# Patient Record
Sex: FEMALE | Race: BLACK OR AFRICAN AMERICAN | NOT HISPANIC OR LATINO | Employment: OTHER | RURAL
[De-identification: names, ages, dates, MRNs, and addresses within clinical notes are randomized per-mention and may not be internally consistent; named-entity substitution may affect disease eponyms.]

---

## 2020-06-10 ENCOUNTER — HISTORICAL (OUTPATIENT)
Dept: ADMINISTRATIVE | Facility: HOSPITAL | Age: 65
End: 2020-06-10

## 2021-05-18 ENCOUNTER — OFFICE VISIT (OUTPATIENT)
Dept: FAMILY MEDICINE | Facility: CLINIC | Age: 66
End: 2021-05-18
Payer: COMMERCIAL

## 2021-05-18 VITALS
TEMPERATURE: 98 F | WEIGHT: 221.19 LBS | OXYGEN SATURATION: 98 % | DIASTOLIC BLOOD PRESSURE: 72 MMHG | HEIGHT: 70 IN | SYSTOLIC BLOOD PRESSURE: 150 MMHG | HEART RATE: 78 BPM | BODY MASS INDEX: 31.67 KG/M2

## 2021-05-18 DIAGNOSIS — Z79.4 TYPE 2 DIABETES MELLITUS WITHOUT COMPLICATION, WITH LONG-TERM CURRENT USE OF INSULIN: Primary | ICD-10-CM

## 2021-05-18 DIAGNOSIS — E11.9 TYPE 2 DIABETES MELLITUS WITHOUT COMPLICATION, WITH LONG-TERM CURRENT USE OF INSULIN: Primary | ICD-10-CM

## 2021-05-18 DIAGNOSIS — M19.90 ARTHRITIS: ICD-10-CM

## 2021-05-18 DIAGNOSIS — E03.9 HYPOTHYROIDISM, UNSPECIFIED TYPE: ICD-10-CM

## 2021-05-18 DIAGNOSIS — I10 ESSENTIAL HYPERTENSION: ICD-10-CM

## 2021-05-18 PROCEDURE — 99213 OFFICE O/P EST LOW 20 MIN: CPT | Mod: ,,, | Performed by: NURSE PRACTITIONER

## 2021-05-18 PROCEDURE — 99213 PR OFFICE/OUTPT VISIT, EST, LEVL III, 20-29 MIN: ICD-10-PCS | Mod: ,,, | Performed by: NURSE PRACTITIONER

## 2021-05-18 RX ORDER — HYDRALAZINE HYDROCHLORIDE 25 MG/1
25 TABLET, FILM COATED ORAL 3 TIMES DAILY
Qty: 90 TABLET | Refills: 1 | Status: SHIPPED | OUTPATIENT
Start: 2021-05-18 | End: 2021-08-11 | Stop reason: SDUPTHER

## 2021-05-18 RX ORDER — ESOMEPRAZOLE MAGNESIUM 40 MG/1
40 CAPSULE, DELAYED RELEASE ORAL DAILY
COMMUNITY
Start: 2021-04-16 | End: 2021-05-18 | Stop reason: SDUPTHER

## 2021-05-18 RX ORDER — LEVOTHYROXINE SODIUM 75 UG/1
75 TABLET ORAL DAILY
COMMUNITY
Start: 2021-04-14 | End: 2021-05-18 | Stop reason: SDUPTHER

## 2021-05-18 RX ORDER — GLIPIZIDE AND METFORMIN HCL 5; 500 MG/1; MG/1
2 TABLET, FILM COATED ORAL
Qty: 360 TABLET | Refills: 1 | Status: SHIPPED | OUTPATIENT
Start: 2021-05-18 | End: 2021-08-11 | Stop reason: SDUPTHER

## 2021-05-18 RX ORDER — VALSARTAN 160 MG/1
160 TABLET ORAL DAILY
Qty: 90 TABLET | Refills: 1 | Status: SHIPPED | OUTPATIENT
Start: 2021-05-18 | End: 2021-08-11 | Stop reason: SDUPTHER

## 2021-05-18 RX ORDER — ATORVASTATIN CALCIUM 40 MG/1
40 TABLET, FILM COATED ORAL DAILY
COMMUNITY
Start: 2021-04-14 | End: 2021-05-18 | Stop reason: SDUPTHER

## 2021-05-18 RX ORDER — CETIRIZINE HYDROCHLORIDE 10 MG/1
10 TABLET ORAL NIGHTLY
Qty: 90 TABLET | Refills: 1 | Status: SHIPPED | OUTPATIENT
Start: 2021-05-18 | End: 2021-08-11 | Stop reason: SDUPTHER

## 2021-05-18 RX ORDER — METOPROLOL SUCCINATE 25 MG/1
25 TABLET, EXTENDED RELEASE ORAL DAILY
COMMUNITY
Start: 2021-04-14 | End: 2021-05-18 | Stop reason: SDUPTHER

## 2021-05-18 RX ORDER — DAPAGLIFLOZIN 10 MG/1
10 TABLET, FILM COATED ORAL DAILY
Qty: 90 TABLET | Refills: 1 | Status: SHIPPED | OUTPATIENT
Start: 2021-05-18 | End: 2021-08-11 | Stop reason: SDUPTHER

## 2021-05-18 RX ORDER — METOPROLOL SUCCINATE 25 MG/1
25 TABLET, EXTENDED RELEASE ORAL DAILY
Qty: 90 TABLET | Refills: 1 | Status: SHIPPED | OUTPATIENT
Start: 2021-05-18 | End: 2021-08-11 | Stop reason: SDUPTHER

## 2021-05-18 RX ORDER — VALSARTAN 160 MG/1
160 TABLET ORAL DAILY
COMMUNITY
Start: 2021-04-14 | End: 2021-05-18 | Stop reason: SDUPTHER

## 2021-05-18 RX ORDER — ASPIRIN 81 MG/1
81 TABLET ORAL DAILY
COMMUNITY

## 2021-05-18 RX ORDER — CETIRIZINE HYDROCHLORIDE 10 MG/1
10 TABLET ORAL NIGHTLY
COMMUNITY
Start: 2021-04-14 | End: 2021-05-18 | Stop reason: SDUPTHER

## 2021-05-18 RX ORDER — GLIPIZIDE AND METFORMIN HCL 5; 500 MG/1; MG/1
TABLET, FILM COATED ORAL
COMMUNITY
Start: 2021-05-15 | End: 2021-05-18 | Stop reason: SDUPTHER

## 2021-05-18 RX ORDER — ATORVASTATIN CALCIUM 40 MG/1
40 TABLET, FILM COATED ORAL DAILY
Qty: 90 TABLET | Refills: 1 | Status: SHIPPED | OUTPATIENT
Start: 2021-05-18 | End: 2021-08-11 | Stop reason: SDUPTHER

## 2021-05-18 RX ORDER — LEVOTHYROXINE SODIUM 75 UG/1
75 TABLET ORAL DAILY
Qty: 30 TABLET | Refills: 1 | Status: SHIPPED | OUTPATIENT
Start: 2021-05-18 | End: 2021-08-11 | Stop reason: SDUPTHER

## 2021-05-18 RX ORDER — TRAMADOL HYDROCHLORIDE 50 MG/1
50 TABLET ORAL EVERY 6 HOURS
Qty: 30 TABLET | Refills: 0 | Status: SHIPPED | OUTPATIENT
Start: 2021-05-18 | End: 2021-05-26

## 2021-05-18 RX ORDER — PEN NEEDLE, DIABETIC 32GX 5/32"
NEEDLE, DISPOSABLE MISCELLANEOUS
COMMUNITY
Start: 2021-05-03 | End: 2021-06-27 | Stop reason: SDUPTHER

## 2021-05-18 RX ORDER — DAPAGLIFLOZIN 10 MG/1
10 TABLET, FILM COATED ORAL DAILY
COMMUNITY
Start: 2021-05-03 | End: 2021-05-18 | Stop reason: SDUPTHER

## 2021-05-18 RX ORDER — HYDRALAZINE HYDROCHLORIDE 25 MG/1
25 TABLET, FILM COATED ORAL 3 TIMES DAILY
COMMUNITY
Start: 2021-04-14 | End: 2021-05-18 | Stop reason: SDUPTHER

## 2021-05-18 RX ORDER — ESOMEPRAZOLE MAGNESIUM 40 MG/1
40 CAPSULE, DELAYED RELEASE ORAL DAILY
Qty: 90 CAPSULE | Refills: 1 | Status: SHIPPED | OUTPATIENT
Start: 2021-05-18 | End: 2021-08-11 | Stop reason: SDUPTHER

## 2021-05-18 RX ORDER — LATANOPROST 50 UG/ML
1 SOLUTION/ DROPS OPHTHALMIC NIGHTLY
COMMUNITY
Start: 2021-05-03

## 2021-06-07 ENCOUNTER — HOSPITAL ENCOUNTER (OUTPATIENT)
Dept: RADIOLOGY | Facility: HOSPITAL | Age: 66
Discharge: HOME | End: 2021-06-07
Attending: ORTHOPAEDIC SURGERY
Payer: COMMERCIAL

## 2021-06-07 DIAGNOSIS — M25.561 RIGHT KNEE PAIN: ICD-10-CM

## 2021-06-07 PROCEDURE — 73560 X-RAY EXAM OF KNEE 1 OR 2: CPT | Mod: TC,RT

## 2021-08-11 ENCOUNTER — OFFICE VISIT (OUTPATIENT)
Dept: FAMILY MEDICINE | Facility: CLINIC | Age: 66
End: 2021-08-11
Payer: COMMERCIAL

## 2021-08-11 VITALS
HEART RATE: 60 BPM | TEMPERATURE: 97 F | OXYGEN SATURATION: 99 % | DIASTOLIC BLOOD PRESSURE: 69 MMHG | SYSTOLIC BLOOD PRESSURE: 165 MMHG | BODY MASS INDEX: 31.9 KG/M2 | WEIGHT: 222.81 LBS | HEIGHT: 70 IN

## 2021-08-11 DIAGNOSIS — Z79.4 TYPE 2 DIABETES MELLITUS WITHOUT COMPLICATION, WITH LONG-TERM CURRENT USE OF INSULIN: Primary | ICD-10-CM

## 2021-08-11 DIAGNOSIS — F32.A DEPRESSION, UNSPECIFIED DEPRESSION TYPE: ICD-10-CM

## 2021-08-11 DIAGNOSIS — I10 ESSENTIAL HYPERTENSION: ICD-10-CM

## 2021-08-11 DIAGNOSIS — E11.9 TYPE 2 DIABETES MELLITUS WITHOUT COMPLICATION, WITH LONG-TERM CURRENT USE OF INSULIN: Primary | ICD-10-CM

## 2021-08-11 PROCEDURE — 83036 HEMOGLOBIN A1C: ICD-10-PCS | Mod: QW,,, | Performed by: CLINICAL MEDICAL LABORATORY

## 2021-08-11 PROCEDURE — 99213 PR OFFICE/OUTPT VISIT, EST, LEVL III, 20-29 MIN: ICD-10-PCS | Mod: ,,, | Performed by: NURSE PRACTITIONER

## 2021-08-11 PROCEDURE — 83036 HEMOGLOBIN GLYCOSYLATED A1C: CPT | Mod: QW,,, | Performed by: CLINICAL MEDICAL LABORATORY

## 2021-08-11 PROCEDURE — 99213 OFFICE O/P EST LOW 20 MIN: CPT | Mod: ,,, | Performed by: NURSE PRACTITIONER

## 2021-08-11 RX ORDER — ESOMEPRAZOLE MAGNESIUM 40 MG/1
40 CAPSULE, DELAYED RELEASE ORAL DAILY
Qty: 90 CAPSULE | Refills: 1 | Status: SHIPPED | OUTPATIENT
Start: 2021-08-11 | End: 2021-12-16 | Stop reason: SDUPTHER

## 2021-08-11 RX ORDER — GLIPIZIDE AND METFORMIN HCL 5; 500 MG/1; MG/1
2 TABLET, FILM COATED ORAL
Qty: 360 TABLET | Refills: 1 | Status: SHIPPED | OUTPATIENT
Start: 2021-08-11 | End: 2022-06-08 | Stop reason: SDUPTHER

## 2021-08-11 RX ORDER — ESCITALOPRAM OXALATE 20 MG/1
20 TABLET ORAL DAILY
COMMUNITY
End: 2021-08-11 | Stop reason: SDUPTHER

## 2021-08-11 RX ORDER — ATORVASTATIN CALCIUM 40 MG/1
40 TABLET, FILM COATED ORAL DAILY
Qty: 90 TABLET | Refills: 1 | Status: SHIPPED | OUTPATIENT
Start: 2021-08-11 | End: 2021-12-16 | Stop reason: SDUPTHER

## 2021-08-11 RX ORDER — DAPAGLIFLOZIN 10 MG/1
10 TABLET, FILM COATED ORAL DAILY
Qty: 90 TABLET | Refills: 1 | Status: SHIPPED | OUTPATIENT
Start: 2021-08-11 | End: 2021-12-16 | Stop reason: SDUPTHER

## 2021-08-11 RX ORDER — LEVOTHYROXINE SODIUM 75 UG/1
75 TABLET ORAL DAILY
Qty: 30 TABLET | Refills: 1 | Status: SHIPPED | OUTPATIENT
Start: 2021-08-11 | End: 2021-11-12

## 2021-08-11 RX ORDER — METOPROLOL SUCCINATE 25 MG/1
25 TABLET, EXTENDED RELEASE ORAL DAILY
Qty: 90 TABLET | Refills: 1 | Status: SHIPPED | OUTPATIENT
Start: 2021-08-11 | End: 2021-12-16 | Stop reason: SDUPTHER

## 2021-08-11 RX ORDER — CETIRIZINE HYDROCHLORIDE 10 MG/1
10 TABLET ORAL NIGHTLY
Qty: 90 TABLET | Refills: 1 | Status: SHIPPED | OUTPATIENT
Start: 2021-08-11 | End: 2021-12-16 | Stop reason: SDUPTHER

## 2021-08-11 RX ORDER — HYDRALAZINE HYDROCHLORIDE 25 MG/1
25 TABLET, FILM COATED ORAL 3 TIMES DAILY
Qty: 90 TABLET | Refills: 1 | Status: SHIPPED | OUTPATIENT
Start: 2021-08-11 | End: 2021-12-16 | Stop reason: SDUPTHER

## 2021-08-11 RX ORDER — VALSARTAN 160 MG/1
160 TABLET ORAL DAILY
Qty: 90 TABLET | Refills: 1 | Status: SHIPPED | OUTPATIENT
Start: 2021-08-11 | End: 2021-12-16 | Stop reason: SDUPTHER

## 2021-08-12 LAB
EST. AVERAGE GLUCOSE BLD GHB EST-MCNC: 174 MG/DL
HBA1C MFR BLD HPLC: 7.8 % (ref 4.5–6.6)

## 2021-08-23 RX ORDER — ESCITALOPRAM OXALATE 20 MG/1
20 TABLET ORAL DAILY
Qty: 30 TABLET | Refills: 3 | Status: SHIPPED | OUTPATIENT
Start: 2021-08-23 | End: 2021-12-16 | Stop reason: SDUPTHER

## 2021-12-16 ENCOUNTER — OFFICE VISIT (OUTPATIENT)
Dept: FAMILY MEDICINE | Facility: CLINIC | Age: 66
End: 2021-12-16
Payer: COMMERCIAL

## 2021-12-16 VITALS
DIASTOLIC BLOOD PRESSURE: 68 MMHG | TEMPERATURE: 97 F | WEIGHT: 223.81 LBS | BODY MASS INDEX: 32.04 KG/M2 | HEART RATE: 52 BPM | SYSTOLIC BLOOD PRESSURE: 157 MMHG | OXYGEN SATURATION: 100 % | HEIGHT: 70 IN

## 2021-12-16 DIAGNOSIS — E11.9 TYPE 2 DIABETES MELLITUS WITHOUT COMPLICATION, WITH LONG-TERM CURRENT USE OF INSULIN: Primary | ICD-10-CM

## 2021-12-16 DIAGNOSIS — Z79.4 TYPE 2 DIABETES MELLITUS WITHOUT COMPLICATION, WITH LONG-TERM CURRENT USE OF INSULIN: Primary | ICD-10-CM

## 2021-12-16 DIAGNOSIS — E03.9 HYPOTHYROIDISM, UNSPECIFIED TYPE: ICD-10-CM

## 2021-12-16 DIAGNOSIS — I10 ESSENTIAL HYPERTENSION: ICD-10-CM

## 2021-12-16 DIAGNOSIS — F32.A DEPRESSION, UNSPECIFIED DEPRESSION TYPE: ICD-10-CM

## 2021-12-16 LAB
ALBUMIN SERPL BCP-MCNC: 3.2 G/DL (ref 3.5–5)
ALBUMIN/GLOB SERPL: 0.7 {RATIO}
ALP SERPL-CCNC: 78 U/L (ref 50–130)
ALT SERPL W P-5'-P-CCNC: 11 U/L (ref 13–56)
ANION GAP SERPL CALCULATED.3IONS-SCNC: 5 MMOL/L (ref 7–16)
AST SERPL W P-5'-P-CCNC: 6 U/L (ref 15–37)
BASOPHILS # BLD AUTO: 0.05 K/UL (ref 0–0.2)
BASOPHILS NFR BLD AUTO: 0.6 % (ref 0–1)
BILIRUB SERPL-MCNC: 0.4 MG/DL (ref 0–1.2)
BILIRUB UR QL STRIP: NEGATIVE
BUN SERPL-MCNC: 11 MG/DL (ref 7–18)
BUN/CREAT SERPL: 14 (ref 6–20)
CALCIUM SERPL-MCNC: 8.8 MG/DL (ref 8.5–10.1)
CHLORIDE SERPL-SCNC: 108 MMOL/L (ref 98–107)
CLARITY UR: CLEAR
CO2 SERPL-SCNC: 30 MMOL/L (ref 21–32)
COLOR UR: YELLOW
CREAT SERPL-MCNC: 0.76 MG/DL (ref 0.55–1.02)
CREAT UR-MCNC: 59 MG/DL (ref 28–219)
DIFFERENTIAL METHOD BLD: ABNORMAL
EOSINOPHIL # BLD AUTO: 0.13 K/UL (ref 0–0.5)
EOSINOPHIL NFR BLD AUTO: 1.5 % (ref 1–4)
ERYTHROCYTE [DISTWIDTH] IN BLOOD BY AUTOMATED COUNT: 16.5 % (ref 11.5–14.5)
EST. AVERAGE GLUCOSE BLD GHB EST-MCNC: 164 MG/DL
GLOBULIN SER-MCNC: 4.5 G/DL (ref 2–4)
GLUCOSE SERPL-MCNC: 131 MG/DL (ref 74–106)
GLUCOSE UR STRIP-MCNC: >=1000 MG/DL
HBA1C MFR BLD HPLC: 7.5 % (ref 4.5–6.6)
HCT VFR BLD AUTO: 36.9 % (ref 38–47)
HGB BLD-MCNC: 11.2 G/DL (ref 12–16)
IMM GRANULOCYTES # BLD AUTO: 0.03 K/UL (ref 0–0.04)
IMM GRANULOCYTES NFR BLD: 0.4 % (ref 0–0.4)
KETONES UR STRIP-SCNC: NEGATIVE MG/DL
LEUKOCYTE ESTERASE UR QL STRIP: NEGATIVE
LYMPHOCYTES # BLD AUTO: 2.84 K/UL (ref 1–4.8)
LYMPHOCYTES NFR BLD AUTO: 33.3 % (ref 27–41)
MCH RBC QN AUTO: 25.3 PG (ref 27–31)
MCHC RBC AUTO-ENTMCNC: 30.4 G/DL (ref 32–36)
MCV RBC AUTO: 83.3 FL (ref 80–96)
MICROALBUMIN UR-MCNC: 6.9 MG/DL (ref 0–2.8)
MICROALBUMIN/CREAT RATIO PNL UR: 116.9 MG/G (ref 0–30)
MONOCYTES # BLD AUTO: 0.53 K/UL (ref 0–0.8)
MONOCYTES NFR BLD AUTO: 6.2 % (ref 2–6)
MPC BLD CALC-MCNC: 11.2 FL (ref 9.4–12.4)
NEUTROPHILS # BLD AUTO: 4.96 K/UL (ref 1.8–7.7)
NEUTROPHILS NFR BLD AUTO: 58 % (ref 53–65)
NITRITE UR QL STRIP: NEGATIVE
NRBC # BLD AUTO: 0 X10E3/UL
NRBC, AUTO (.00): 0 %
PH UR STRIP: 7 PH UNITS
PLATELET # BLD AUTO: 280 K/UL (ref 150–400)
POTASSIUM SERPL-SCNC: 4.4 MMOL/L (ref 3.5–5.1)
PROT SERPL-MCNC: 7.7 G/DL (ref 6.4–8.2)
PROT UR QL STRIP: NEGATIVE
RBC # BLD AUTO: 4.43 M/UL (ref 4.2–5.4)
RBC # UR STRIP: NEGATIVE /UL
SODIUM SERPL-SCNC: 139 MMOL/L (ref 136–145)
SP GR UR STRIP: 1.01
T4 SERPL-MCNC: 8.3 ΜG/DL (ref 4.8–13.9)
TSH SERPL DL<=0.005 MIU/L-ACNC: 2.15 UIU/ML (ref 0.36–3.74)
UROBILINOGEN UR STRIP-ACNC: 1 MG/DL
WBC # BLD AUTO: 8.54 K/UL (ref 4.5–11)

## 2021-12-16 PROCEDURE — 81003 URINALYSIS AUTO W/O SCOPE: CPT | Mod: QW,,, | Performed by: CLINICAL MEDICAL LABORATORY

## 2021-12-16 PROCEDURE — 3078F DIAST BP <80 MM HG: CPT | Mod: ,,, | Performed by: NURSE PRACTITIONER

## 2021-12-16 PROCEDURE — 3077F SYST BP >= 140 MM HG: CPT | Mod: ,,, | Performed by: NURSE PRACTITIONER

## 2021-12-16 PROCEDURE — 80053 COMPREHEN METABOLIC PANEL: CPT | Mod: ,,, | Performed by: CLINICAL MEDICAL LABORATORY

## 2021-12-16 PROCEDURE — 3008F PR BODY MASS INDEX (BMI) DOCUMENTED: ICD-10-PCS | Mod: ,,, | Performed by: NURSE PRACTITIONER

## 2021-12-16 PROCEDURE — 82043 MICROALBUMIN / CREATININE RATIO URINE: ICD-10-PCS | Mod: ,,, | Performed by: CLINICAL MEDICAL LABORATORY

## 2021-12-16 PROCEDURE — 3077F PR MOST RECENT SYSTOLIC BLOOD PRESSURE >= 140 MM HG: ICD-10-PCS | Mod: ,,, | Performed by: NURSE PRACTITIONER

## 2021-12-16 PROCEDURE — 84436 T4: ICD-10-PCS | Mod: ,,, | Performed by: CLINICAL MEDICAL LABORATORY

## 2021-12-16 PROCEDURE — 83036 HEMOGLOBIN A1C: ICD-10-PCS | Mod: ,,, | Performed by: CLINICAL MEDICAL LABORATORY

## 2021-12-16 PROCEDURE — 84443 ASSAY THYROID STIM HORMONE: CPT | Mod: ,,, | Performed by: CLINICAL MEDICAL LABORATORY

## 2021-12-16 PROCEDURE — 3066F NEPHROPATHY DOC TX: CPT | Mod: ,,, | Performed by: NURSE PRACTITIONER

## 2021-12-16 PROCEDURE — 3051F HG A1C>EQUAL 7.0%<8.0%: CPT | Mod: ,,, | Performed by: NURSE PRACTITIONER

## 2021-12-16 PROCEDURE — 99214 OFFICE O/P EST MOD 30 MIN: CPT | Mod: ,,, | Performed by: NURSE PRACTITIONER

## 2021-12-16 PROCEDURE — 85025 CBC WITH DIFFERENTIAL: ICD-10-PCS | Mod: ,,, | Performed by: CLINICAL MEDICAL LABORATORY

## 2021-12-16 PROCEDURE — 84443 TSH: ICD-10-PCS | Mod: ,,, | Performed by: CLINICAL MEDICAL LABORATORY

## 2021-12-16 PROCEDURE — 1159F PR MEDICATION LIST DOCUMENTED IN MEDICAL RECORD: ICD-10-PCS | Mod: ,,, | Performed by: NURSE PRACTITIONER

## 2021-12-16 PROCEDURE — 1160F RVW MEDS BY RX/DR IN RCRD: CPT | Mod: ,,, | Performed by: NURSE PRACTITIONER

## 2021-12-16 PROCEDURE — 82570 MICROALBUMIN / CREATININE RATIO URINE: ICD-10-PCS | Mod: ,,, | Performed by: CLINICAL MEDICAL LABORATORY

## 2021-12-16 PROCEDURE — 3051F PR MOST RECENT HEMOGLOBIN A1C LEVEL 7.0 - < 8.0%: ICD-10-PCS | Mod: ,,, | Performed by: NURSE PRACTITIONER

## 2021-12-16 PROCEDURE — 4010F PR ACE/ARB THEARPY RXD/TAKEN: ICD-10-PCS | Mod: ,,, | Performed by: NURSE PRACTITIONER

## 2021-12-16 PROCEDURE — 3060F POS MICROALBUMINURIA REV: CPT | Mod: ,,, | Performed by: NURSE PRACTITIONER

## 2021-12-16 PROCEDURE — 1160F PR REVIEW ALL MEDS BY PRESCRIBER/CLIN PHARMACIST DOCUMENTED: ICD-10-PCS | Mod: ,,, | Performed by: NURSE PRACTITIONER

## 2021-12-16 PROCEDURE — 1159F MED LIST DOCD IN RCRD: CPT | Mod: ,,, | Performed by: NURSE PRACTITIONER

## 2021-12-16 PROCEDURE — 81003 URINALYSIS: ICD-10-PCS | Mod: QW,,, | Performed by: CLINICAL MEDICAL LABORATORY

## 2021-12-16 PROCEDURE — 80053 COMPREHENSIVE METABOLIC PANEL: ICD-10-PCS | Mod: ,,, | Performed by: CLINICAL MEDICAL LABORATORY

## 2021-12-16 PROCEDURE — 3078F PR MOST RECENT DIASTOLIC BLOOD PRESSURE < 80 MM HG: ICD-10-PCS | Mod: ,,, | Performed by: NURSE PRACTITIONER

## 2021-12-16 PROCEDURE — 84436 ASSAY OF TOTAL THYROXINE: CPT | Mod: ,,, | Performed by: CLINICAL MEDICAL LABORATORY

## 2021-12-16 PROCEDURE — 99214 PR OFFICE/OUTPT VISIT, EST, LEVL IV, 30-39 MIN: ICD-10-PCS | Mod: ,,, | Performed by: NURSE PRACTITIONER

## 2021-12-16 PROCEDURE — 3008F BODY MASS INDEX DOCD: CPT | Mod: ,,, | Performed by: NURSE PRACTITIONER

## 2021-12-16 PROCEDURE — 85025 COMPLETE CBC W/AUTO DIFF WBC: CPT | Mod: ,,, | Performed by: CLINICAL MEDICAL LABORATORY

## 2021-12-16 PROCEDURE — 3066F PR DOCUMENTATION OF TREATMENT FOR NEPHROPATHY: ICD-10-PCS | Mod: ,,, | Performed by: NURSE PRACTITIONER

## 2021-12-16 PROCEDURE — 82043 UR ALBUMIN QUANTITATIVE: CPT | Mod: ,,, | Performed by: CLINICAL MEDICAL LABORATORY

## 2021-12-16 PROCEDURE — 4010F ACE/ARB THERAPY RXD/TAKEN: CPT | Mod: ,,, | Performed by: NURSE PRACTITIONER

## 2021-12-16 PROCEDURE — 3060F PR POS MICROALBUMINURIA RESULT DOCUMENTED/REVIEW: ICD-10-PCS | Mod: ,,, | Performed by: NURSE PRACTITIONER

## 2021-12-16 PROCEDURE — 83036 HEMOGLOBIN GLYCOSYLATED A1C: CPT | Mod: ,,, | Performed by: CLINICAL MEDICAL LABORATORY

## 2021-12-16 PROCEDURE — 82570 ASSAY OF URINE CREATININE: CPT | Mod: ,,, | Performed by: CLINICAL MEDICAL LABORATORY

## 2021-12-16 RX ORDER — METOPROLOL SUCCINATE 25 MG/1
25 TABLET, EXTENDED RELEASE ORAL DAILY
Qty: 90 TABLET | Refills: 1 | Status: SHIPPED | OUTPATIENT
Start: 2021-12-16 | End: 2022-06-08 | Stop reason: SDUPTHER

## 2021-12-16 RX ORDER — ATORVASTATIN CALCIUM 40 MG/1
40 TABLET, FILM COATED ORAL DAILY
Qty: 90 TABLET | Refills: 1 | Status: SHIPPED | OUTPATIENT
Start: 2021-12-16 | End: 2022-06-08 | Stop reason: SDUPTHER

## 2021-12-16 RX ORDER — VALSARTAN 160 MG/1
160 TABLET ORAL DAILY
Qty: 90 TABLET | Refills: 1 | Status: SHIPPED | OUTPATIENT
Start: 2021-12-16 | End: 2022-06-08 | Stop reason: SDUPTHER

## 2021-12-16 RX ORDER — HYDRALAZINE HYDROCHLORIDE 25 MG/1
25 TABLET, FILM COATED ORAL 3 TIMES DAILY
Qty: 90 TABLET | Refills: 1 | Status: SHIPPED | OUTPATIENT
Start: 2021-12-16 | End: 2022-06-08 | Stop reason: SDUPTHER

## 2021-12-16 RX ORDER — DAPAGLIFLOZIN 10 MG/1
10 TABLET, FILM COATED ORAL DAILY
Qty: 90 TABLET | Refills: 1 | Status: SHIPPED | OUTPATIENT
Start: 2021-12-16 | End: 2022-06-08 | Stop reason: SDUPTHER

## 2021-12-16 RX ORDER — LEVOTHYROXINE SODIUM 75 UG/1
75 TABLET ORAL EVERY MORNING
Qty: 30 TABLET | Refills: 5 | Status: SHIPPED | OUTPATIENT
Start: 2021-12-16 | End: 2022-06-08 | Stop reason: SDUPTHER

## 2021-12-16 RX ORDER — CETIRIZINE HYDROCHLORIDE 10 MG/1
10 TABLET ORAL NIGHTLY
Qty: 90 TABLET | Refills: 1 | Status: SHIPPED | OUTPATIENT
Start: 2021-12-16 | End: 2022-06-24 | Stop reason: SDUPTHER

## 2021-12-16 RX ORDER — ESCITALOPRAM OXALATE 20 MG/1
20 TABLET ORAL DAILY
Qty: 30 TABLET | Refills: 3 | Status: SHIPPED | OUTPATIENT
Start: 2021-12-16 | End: 2022-06-08 | Stop reason: SDUPTHER

## 2021-12-16 RX ORDER — ESOMEPRAZOLE MAGNESIUM 40 MG/1
40 CAPSULE, DELAYED RELEASE ORAL DAILY
Qty: 90 CAPSULE | Refills: 1 | Status: SHIPPED | OUTPATIENT
Start: 2021-12-16 | End: 2022-06-08 | Stop reason: SDUPTHER

## 2022-01-08 ENCOUNTER — HOSPITAL ENCOUNTER (EMERGENCY)
Facility: HOSPITAL | Age: 67
Discharge: HOME | End: 2022-01-08
Payer: COMMERCIAL

## 2022-01-08 VITALS
DIASTOLIC BLOOD PRESSURE: 74 MMHG | BODY MASS INDEX: 34.03 KG/M2 | SYSTOLIC BLOOD PRESSURE: 180 MMHG | HEART RATE: 66 BPM | TEMPERATURE: 99 F | RESPIRATION RATE: 18 BRPM | HEIGHT: 68 IN | OXYGEN SATURATION: 95 %

## 2022-01-08 DIAGNOSIS — R05.9 COUGH: ICD-10-CM

## 2022-01-08 DIAGNOSIS — U07.1 COVID-19: Primary | ICD-10-CM

## 2022-01-08 LAB
FLUAV AG UPPER RESP QL IA.RAPID: NEGATIVE
FLUBV AG UPPER RESP QL IA.RAPID: NEGATIVE
SARS-COV+SARS-COV-2 AG RESP QL IA.RAPID: POSITIVE

## 2022-01-08 PROCEDURE — 87804 INFLUENZA ASSAY W/OPTIC: CPT | Performed by: NURSE PRACTITIONER

## 2022-01-08 PROCEDURE — 87426 SARSCOV CORONAVIRUS AG IA: CPT | Performed by: NURSE PRACTITIONER

## 2022-01-08 PROCEDURE — 99283 EMERGENCY DEPT VISIT LOW MDM: CPT

## 2022-01-08 PROCEDURE — 99283 EMERGENCY DEPT VISIT LOW MDM: CPT | Mod: GF | Performed by: NURSE PRACTITIONER

## 2022-01-08 RX ORDER — ALBUTEROL SULFATE 90 UG/1
2 AEROSOL, METERED RESPIRATORY (INHALATION) EVERY 6 HOURS PRN
Qty: 18 G | Refills: 0 | Status: SHIPPED | OUTPATIENT
Start: 2022-01-08 | End: 2022-06-08

## 2022-01-08 RX ORDER — AZITHROMYCIN 250 MG/1
250 TABLET, FILM COATED ORAL DAILY
Qty: 6 TABLET | Refills: 0 | Status: SHIPPED | OUTPATIENT
Start: 2022-01-08 | End: 2022-05-21 | Stop reason: CLARIF

## 2022-01-08 RX ORDER — MONTELUKAST SODIUM 10 MG/1
10 TABLET ORAL NIGHTLY
Qty: 30 TABLET | Refills: 0 | Status: SHIPPED | OUTPATIENT
Start: 2022-01-08 | End: 2022-02-07

## 2022-01-08 RX ORDER — BENZONATATE 100 MG/1
100 CAPSULE ORAL 3 TIMES DAILY PRN
Qty: 30 CAPSULE | Refills: 0 | Status: SHIPPED | OUTPATIENT
Start: 2022-01-08 | End: 2022-01-18

## 2022-01-08 RX ORDER — AZITHROMYCIN 250 MG/1
250 TABLET, FILM COATED ORAL DAILY
Qty: 6 TABLET | Refills: 0 | Status: SHIPPED | OUTPATIENT
Start: 2022-01-08 | End: 2022-01-08 | Stop reason: SDUPTHER

## 2022-01-08 NOTE — ED PROVIDER NOTES
Encounter Date: 1/8/2022       History     Chief Complaint   Patient presents with    Cough     Estefania Urias is a 67 yo AAF who presents with productive cough with white sputum x 2 days. Denies fever, SOB, wheezing. No otalgia or sore throat.         Review of patient's allergies indicates:  No Known Allergies  Past Medical History:   Diagnosis Date    Diabetes mellitus, type 2     Hypertension      Past Surgical History:   Procedure Laterality Date    HYSTERECTOMY       History reviewed. No pertinent family history.  Social History     Tobacco Use    Smoking status: Never Smoker    Smokeless tobacco: Never Used   Substance Use Topics    Alcohol use: Never    Drug use: Never     Review of Systems   Constitutional: Positive for fatigue.   Respiratory: Positive for cough. Negative for chest tightness, shortness of breath and wheezing.    Cardiovascular: Negative for chest pain, palpitations and leg swelling.   Neurological: Negative for dizziness, light-headedness and headaches.   All other systems reviewed and are negative.      Physical Exam     Initial Vitals [01/08/22 1438]   BP Pulse Resp Temp SpO2   (!) 185/73 67 20 98.7 °F (37.1 °C) 96 %      MAP       --         Physical Exam    Nursing note and vitals reviewed.  Constitutional: She appears well-developed and well-nourished.   HENT:   Head: Normocephalic and atraumatic.   Right Ear: External ear normal.   Left Ear: External ear normal.   Nose: Nose normal.   Mouth/Throat: Oropharynx is clear and moist.   Eyes: EOM are normal. Pupils are equal, round, and reactive to light.   Neck: Neck supple.   Normal range of motion.  Cardiovascular: Normal rate and regular rhythm.   Pulmonary/Chest: Effort normal and breath sounds normal.   Musculoskeletal:         General: Normal range of motion.      Cervical back: Normal range of motion and neck supple.     Neurological: She is alert and oriented to person, place, and time. GCS score is 15. GCS eye subscore is  4. GCS verbal subscore is 5. GCS motor subscore is 6.   Skin: Skin is warm.   Psychiatric: She has a normal mood and affect. Her behavior is normal. Judgment and thought content normal.         Medical Screening Exam   See Full Note    ED Course   Procedures  Labs Reviewed   SARS ANTIGEN(MARIANGEL) - Abnormal; Notable for the following components:       Result Value    COVID-19 Ag Positive (*)     All other components within normal limits    Narrative:     Positive SARS-CoV antigen results indicate the presence of viral antigens; correlation with patient history and presence of clinical signs & symptoms consistent with COVID-19 are necessary to determine infection status.   RAPID INFLUENZA A/B - Normal          Imaging Results    None          Medications - No data to display  Medical Decision Making:   Initial Assessment:   Cough  Differential Diagnosis:   Bronchitis, PNA, common cold, COVID  Clinical Tests:   Lab Tests: Ordered and Reviewed  ED Management:  - 67 yo AAF with cough   - COVID positive  - Discussed isolation and vitamin regimen  - Return precautions given             ED Course as of 01/08/22 1511   Sat Jan 08, 2022   1508 COVID-19 Ag(!!): Positive [MJ]      ED Course User Index  [MJ] KENZIE Beltran          Clinical Impression:   Final diagnoses:  [U07.1] COVID-19 (Primary)  [R05.9] Cough          ED Disposition Condition    Discharge Stable        ED Prescriptions     Medication Sig Dispense Start Date End Date Auth. Provider    montelukast (SINGULAIR) 10 mg tablet Take 1 tablet (10 mg total) by mouth every evening. 30 tablet 1/8/2022 2/7/2022 KENZIE Beltran    albuterol (PROAIR HFA) 90 mcg/actuation inhaler Inhale 2 puffs into the lungs every 6 (six) hours as needed for Wheezing. Rescue 18 g 1/8/2022 1/8/2023 KENZIE Beltran    benzonatate (TESSALON) 100 MG capsule Take 1 capsule (100 mg total) by mouth 3 (three) times daily as needed for Cough. 30 capsule 1/8/2022 1/18/2022 Jennifer  KENZIE Patel        Follow-up Information     Follow up With Specialties Details Why Contact Info    [facility] In 3 days If symptoms worsen [address]  [facility]  [place] [address] [phone]             KENZIE Beltran  01/08/22 9645

## 2022-01-08 NOTE — DISCHARGE INSTRUCTIONS
You are Positive for COVID. You must quarantine for 5 days AND be fever free for 24 hours without the use of Tylenol or Ibuprofen.     The following has shown some improvement in COVID symptoms:     Zyrtec 10 mg 2 x a day  Mucinex 600 mg 2 x a day  Pepcid 20 mg 2 x a day  Vitamin C 500 mg 2 x a day  Vitamin D3 5000 IU daily  Zinc 50 mg daily  Singulair 10 mg at bedtime  Aspirin 81 mg daily  Flonase daily if needed for sinus symptoms.     All of this is available over the counter except for Singulair - prescription provided. Make sure you are drinking plenty of fluids and rest. Thank you for choosing [facility] for your health care needs.

## 2022-05-21 ENCOUNTER — HOSPITAL ENCOUNTER (EMERGENCY)
Facility: HOSPITAL | Age: 67
Discharge: HOME | End: 2022-05-21
Payer: COMMERCIAL

## 2022-05-21 VITALS
WEIGHT: 222 LBS | RESPIRATION RATE: 17 BRPM | DIASTOLIC BLOOD PRESSURE: 73 MMHG | HEIGHT: 70 IN | SYSTOLIC BLOOD PRESSURE: 192 MMHG | BODY MASS INDEX: 31.78 KG/M2 | TEMPERATURE: 98 F | HEART RATE: 55 BPM | OXYGEN SATURATION: 99 %
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
## 2022-05-21 DIAGNOSIS — M25.559 HIP PAIN: ICD-10-CM

## 2022-05-21 PROCEDURE — 63600175 PHARM REV CODE 636 W HCPCS

## 2022-05-21 PROCEDURE — 99284 EMERGENCY DEPT VISIT MOD MDM: CPT | Mod: 25

## 2022-05-21 PROCEDURE — 96372 THER/PROPH/DIAG INJ SC/IM: CPT

## 2022-05-21 PROCEDURE — 99283 EMERGENCY DEPT VISIT LOW MDM: CPT | Mod: GF,25

## 2022-05-21 RX ORDER — ORPHENADRINE CITRATE 30 MG/ML
60 INJECTION INTRAMUSCULAR; INTRAVENOUS
Status: COMPLETED | OUTPATIENT
Start: 2022-05-21 | End: 2022-05-21

## 2022-05-21 RX ORDER — IBUPROFEN 800 MG/1
800 TABLET ORAL EVERY 6 HOURS PRN
Qty: 12 TABLET | Refills: 0 | Status: SHIPPED | OUTPATIENT
Start: 2022-05-21 | End: 2022-05-24

## 2022-05-21 RX ORDER — TIZANIDINE 4 MG/1
4 TABLET ORAL EVERY 6 HOURS PRN
Qty: 12 TABLET | Refills: 0 | Status: SHIPPED | OUTPATIENT
Start: 2022-05-21 | End: 2022-05-24

## 2022-05-21 RX ORDER — KETOROLAC TROMETHAMINE 30 MG/ML
30 INJECTION, SOLUTION INTRAMUSCULAR; INTRAVENOUS
Status: COMPLETED | OUTPATIENT
Start: 2022-05-21 | End: 2022-05-21

## 2022-05-21 RX ADMIN — ORPHENADRINE CITRATE 60 MG: 30 INJECTION INTRAMUSCULAR; INTRAVENOUS at 04:05

## 2022-05-21 RX ADMIN — KETOROLAC TROMETHAMINE 30 MG: 30 INJECTION, SOLUTION INTRAMUSCULAR at 04:05

## 2022-05-21 NOTE — ED PROVIDER NOTES
Encounter Date: 5/21/2022       History     Chief Complaint   Patient presents with    Fall    Hip Pain     Mrs. Urias is a 67 y/o who presents to the Emergency Department with c/o left hip s/p fall. Patient stated that she tripped on the rug at the front entrance. Patient denies any chest pain, shortness of breath, nausea, vomiting, and LOC.       The history is provided by the patient.   Fall  The accident occurred just prior to arrival. The fall occurred while walking. She fell from a height of 3 to 5 ft. She landed on carpet. The point of impact was the left hip. The pain is present in the left hip. The pain is at a severity of 6/10. She was ambulatory at the scene. There was no entrapment after the fall. There was no drug use involved in the accident. There was no alcohol use involved in the accident. Pertinent negatives include no neck pain, no back pain, no paresthesias, no paralysis, no visual change, no fever, no numbness, no abdominal pain, no bowel incontinence, no nausea, no vomiting, no hematuria, no headaches, no hearing loss, no loss of consciousness and no tingling. The symptoms are aggravated by activity. She has tried nothing for the symptoms. The treatment provided no relief.   Hip Pain  Pertinent negatives include no chest pain, no abdominal pain, no headaches and no shortness of breath.     Review of patient's allergies indicates:  No Known Allergies  Past Medical History:   Diagnosis Date    Diabetes mellitus, type 2     Hypertension      Past Surgical History:   Procedure Laterality Date    HYSTERECTOMY       History reviewed. No pertinent family history.  Social History     Tobacco Use    Smoking status: Never Smoker    Smokeless tobacco: Never Used   Substance Use Topics    Alcohol use: Never    Drug use: Never     Review of Systems   Constitutional: Negative for fever.   HENT: Negative for sore throat.    Respiratory: Negative for apnea, cough, choking, chest tightness,  shortness of breath, wheezing and stridor.    Cardiovascular: Negative for chest pain, palpitations and leg swelling.   Gastrointestinal: Negative for abdominal pain, bowel incontinence, nausea and vomiting.   Genitourinary: Negative for dysuria and hematuria.   Musculoskeletal: Negative for arthralgias, back pain, gait problem, joint swelling, myalgias, neck pain and neck stiffness.   Skin: Negative for rash.   Neurological: Negative for tingling, loss of consciousness, weakness, numbness, headaches and paresthesias.   Hematological: Does not bruise/bleed easily.       Physical Exam     Initial Vitals [05/21/22 1550]   BP Pulse Resp Temp SpO2   (!) 192/73 (!) 55 17 98 °F (36.7 °C) 99 %      MAP       --         Physical Exam    Constitutional: She appears well-developed and well-nourished. She is not diaphoretic. She is cooperative.  Non-toxic appearance. She does not have a sickly appearance. She does not appear ill. No distress.   HENT:   Head: Normocephalic and atraumatic.   Eyes: Conjunctivae, EOM and lids are normal. Pupils are equal, round, and reactive to light.   Neck: Trachea normal. Neck supple.   Normal range of motion.   Full passive range of motion without pain.     Cardiovascular: Normal rate, regular rhythm, S1 normal, S2 normal, normal heart sounds and normal pulses. Exam reveals no friction rub.    No murmur heard.  Pulmonary/Chest: Effort normal and breath sounds normal. She has no decreased breath sounds. She has no wheezes. She has no rhonchi. She has no rales.   Abdominal: Abdomen is soft and flat. Bowel sounds are normal. There is no abdominal tenderness.   Musculoskeletal:      Cervical back: Full passive range of motion without pain, normal range of motion and neck supple.      Left hip: Tenderness present. No deformity, lacerations, bony tenderness or crepitus. Normal range of motion. Normal strength.     Lymphadenopathy:     She has no cervical adenopathy.   Neurological: She is alert and  oriented to person, place, and time. She has normal strength. No cranial nerve deficit or sensory deficit. GCS eye subscore is 4. GCS verbal subscore is 5. GCS motor subscore is 6.   Skin: Skin is warm, dry and intact. Capillary refill takes less than 2 seconds.   Psychiatric: She has a normal mood and affect. Her speech is normal and behavior is normal. Judgment and thought content normal. Cognition and memory are normal.         Medical Screening Exam   See Full Note    ED Course   Procedures  Labs Reviewed - No data to display       Imaging Results          X-Ray Hip 2 or 3 views Left (with Pelvis when performed) (Final result)  Result time 05/21/22 15:48:41    Final result by Susu Schwartz MD (05/21/22 15:48:41)                 Impression:      Osteoarthritis.  No evidence of acute fracture or dislocation.      Electronically signed by: Susu Schwartz  Date:    05/21/2022  Time:    15:48             Narrative:    EXAMINATION:  XR HIP WITH PELVIS WHEN PERFORMED, 2 OR 3 VIEWS LEFT    CLINICAL HISTORY:  Pain;    TECHNIQUE:  AP view of the pelvis and frog leg lateral view of the left hip were performed.    COMPARISON:  None    FINDINGS:  The pubic symphysis and SI joints are of normal width. Degenerative changes are noted at both SI joints at the lower lumbar spine. The hip joint spaces demonstrate narrowing inferiorly and medially. There is mild acetabular osteophyte formation. There is no evidence of acute fracture or dislocation.                                 Medications   ketorolac injection 30 mg (has no administration in time range)   orphenadrine injection 60 mg (has no administration in time range)                       Clinical Impression:   Final diagnoses:  [W19.XXXA] Fall, initial encounter (Primary)  [M25.559] Hip pain          ED Disposition Condition    Discharge Stable        ED Prescriptions     Medication Sig Dispense Start Date End Date Auth. Provider    tiZANidine (ZANAFLEX) 4 MG tablet Take 1  tablet (4 mg total) by mouth every 6 (six) hours as needed (muscle spasm). 12 tablet 5/21/2022 5/24/2022 KENZIE Mariano    ibuprofen (ADVIL,MOTRIN) 800 MG tablet Take 1 tablet (800 mg total) by mouth every 6 (six) hours as needed for Pain. 12 tablet 5/21/2022 5/24/2022 KENZIE Mariano        Follow-up Information     Follow up With Specialties Details Why Contact Info    [facility] Schedule an appointment as soon as possible for a visit  As needed, If symptoms worsen [address]  [facility]  [place] [address] [phone]             KENZIE Mariano  05/21/22 1600

## 2022-05-21 NOTE — ED TRIAGE NOTES
Presents to ER s/p Fall. Patient reports tripping on the entry rug at [facility] landing on Left Hip. Ambulating without difficulty. Ice pack given at triage.

## 2022-05-21 NOTE — DISCHARGE INSTRUCTIONS
Take medication as directed by the label on the bottle, drink plenty of water, see Cindy Angella in the clinic as needed, return to the ED as any acute chest pain, shortness of breath, nausea and/or vomiting.

## 2022-06-08 ENCOUNTER — OFFICE VISIT (OUTPATIENT)
Dept: FAMILY MEDICINE | Facility: CLINIC | Age: 67
End: 2022-06-08
Payer: COMMERCIAL

## 2022-06-08 VITALS
BODY MASS INDEX: 32.1 KG/M2 | WEIGHT: 224.19 LBS | HEIGHT: 70 IN | OXYGEN SATURATION: 97 % | TEMPERATURE: 98 F | HEART RATE: 62 BPM | DIASTOLIC BLOOD PRESSURE: 73 MMHG | SYSTOLIC BLOOD PRESSURE: 152 MMHG

## 2022-06-08 DIAGNOSIS — I10 ESSENTIAL HYPERTENSION: ICD-10-CM

## 2022-06-08 DIAGNOSIS — E78.5 DYSLIPIDEMIA: ICD-10-CM

## 2022-06-08 DIAGNOSIS — E11.9 TYPE 2 DIABETES MELLITUS WITHOUT COMPLICATION, WITH LONG-TERM CURRENT USE OF INSULIN: Primary | ICD-10-CM

## 2022-06-08 DIAGNOSIS — E03.9 HYPOTHYROIDISM, UNSPECIFIED TYPE: ICD-10-CM

## 2022-06-08 DIAGNOSIS — K21.9 GASTROESOPHAGEAL REFLUX DISEASE, UNSPECIFIED WHETHER ESOPHAGITIS PRESENT: ICD-10-CM

## 2022-06-08 DIAGNOSIS — Z11.59 NEED FOR HEPATITIS C SCREENING TEST: ICD-10-CM

## 2022-06-08 DIAGNOSIS — E55.9 VITAMIN D DEFICIENCY, UNSPECIFIED: ICD-10-CM

## 2022-06-08 DIAGNOSIS — F32.A DEPRESSION, UNSPECIFIED DEPRESSION TYPE: ICD-10-CM

## 2022-06-08 DIAGNOSIS — Z79.4 TYPE 2 DIABETES MELLITUS WITHOUT COMPLICATION, WITH LONG-TERM CURRENT USE OF INSULIN: Primary | ICD-10-CM

## 2022-06-08 PROBLEM — M17.9 OSTEOARTHRITIS OF KNEE: Status: ACTIVE | Noted: 2021-06-09

## 2022-06-08 PROBLEM — E05.00 GRAVES' DISEASE: Status: ACTIVE | Noted: 2022-06-08

## 2022-06-08 PROCEDURE — 80061 LIPID PANEL: CPT | Mod: ,,, | Performed by: CLINICAL MEDICAL LABORATORY

## 2022-06-08 PROCEDURE — 86803 HEPATITIS C AB TEST: CPT | Mod: ,,, | Performed by: CLINICAL MEDICAL LABORATORY

## 2022-06-08 PROCEDURE — 99214 OFFICE O/P EST MOD 30 MIN: CPT | Mod: ,,, | Performed by: REGISTERED NURSE

## 2022-06-08 PROCEDURE — 82306 VITAMIN D 25 HYDROXY: CPT | Mod: ,,, | Performed by: CLINICAL MEDICAL LABORATORY

## 2022-06-08 PROCEDURE — 1160F RVW MEDS BY RX/DR IN RCRD: CPT | Mod: ,,, | Performed by: REGISTERED NURSE

## 2022-06-08 PROCEDURE — 3077F PR MOST RECENT SYSTOLIC BLOOD PRESSURE >= 140 MM HG: ICD-10-PCS | Mod: ,,, | Performed by: REGISTERED NURSE

## 2022-06-08 PROCEDURE — 3077F SYST BP >= 140 MM HG: CPT | Mod: ,,, | Performed by: REGISTERED NURSE

## 2022-06-08 PROCEDURE — 83036 HEMOGLOBIN GLYCOSYLATED A1C: CPT | Mod: ,,, | Performed by: CLINICAL MEDICAL LABORATORY

## 2022-06-08 PROCEDURE — 82043 MICROALBUMIN / CREATININE RATIO URINE: ICD-10-PCS | Mod: ,,, | Performed by: CLINICAL MEDICAL LABORATORY

## 2022-06-08 PROCEDURE — 85025 CBC WITH DIFFERENTIAL: ICD-10-PCS | Mod: ,,, | Performed by: CLINICAL MEDICAL LABORATORY

## 2022-06-08 PROCEDURE — 4010F PR ACE/ARB THEARPY RXD/TAKEN: ICD-10-PCS | Mod: ,,, | Performed by: REGISTERED NURSE

## 2022-06-08 PROCEDURE — 80053 COMPREHENSIVE METABOLIC PANEL: ICD-10-PCS | Mod: ,,, | Performed by: CLINICAL MEDICAL LABORATORY

## 2022-06-08 PROCEDURE — 80053 COMPREHEN METABOLIC PANEL: CPT | Mod: ,,, | Performed by: CLINICAL MEDICAL LABORATORY

## 2022-06-08 PROCEDURE — 85025 COMPLETE CBC W/AUTO DIFF WBC: CPT | Mod: ,,, | Performed by: CLINICAL MEDICAL LABORATORY

## 2022-06-08 PROCEDURE — 84443 TSH: ICD-10-PCS | Mod: ,,, | Performed by: CLINICAL MEDICAL LABORATORY

## 2022-06-08 PROCEDURE — 86803 HEPATITIS C ANTIBODY: ICD-10-PCS | Mod: ,,, | Performed by: CLINICAL MEDICAL LABORATORY

## 2022-06-08 PROCEDURE — 82570 ASSAY OF URINE CREATININE: CPT | Mod: ,,, | Performed by: CLINICAL MEDICAL LABORATORY

## 2022-06-08 PROCEDURE — 82043 UR ALBUMIN QUANTITATIVE: CPT | Mod: ,,, | Performed by: CLINICAL MEDICAL LABORATORY

## 2022-06-08 PROCEDURE — 82306 VITAMIN D: ICD-10-PCS | Mod: ,,, | Performed by: CLINICAL MEDICAL LABORATORY

## 2022-06-08 PROCEDURE — 80061 LIPID PANEL: ICD-10-PCS | Mod: ,,, | Performed by: CLINICAL MEDICAL LABORATORY

## 2022-06-08 PROCEDURE — 83036 HEMOGLOBIN A1C: ICD-10-PCS | Mod: ,,, | Performed by: CLINICAL MEDICAL LABORATORY

## 2022-06-08 PROCEDURE — 1159F MED LIST DOCD IN RCRD: CPT | Mod: ,,, | Performed by: REGISTERED NURSE

## 2022-06-08 PROCEDURE — 3051F HG A1C>EQUAL 7.0%<8.0%: CPT | Mod: ,,, | Performed by: REGISTERED NURSE

## 2022-06-08 PROCEDURE — 3008F PR BODY MASS INDEX (BMI) DOCUMENTED: ICD-10-PCS | Mod: ,,, | Performed by: REGISTERED NURSE

## 2022-06-08 PROCEDURE — 3078F DIAST BP <80 MM HG: CPT | Mod: ,,, | Performed by: REGISTERED NURSE

## 2022-06-08 PROCEDURE — 82570 MICROALBUMIN / CREATININE RATIO URINE: ICD-10-PCS | Mod: ,,, | Performed by: CLINICAL MEDICAL LABORATORY

## 2022-06-08 PROCEDURE — 3051F PR MOST RECENT HEMOGLOBIN A1C LEVEL 7.0 - < 8.0%: ICD-10-PCS | Mod: ,,, | Performed by: REGISTERED NURSE

## 2022-06-08 PROCEDURE — 1159F PR MEDICATION LIST DOCUMENTED IN MEDICAL RECORD: ICD-10-PCS | Mod: ,,, | Performed by: REGISTERED NURSE

## 2022-06-08 PROCEDURE — 81003 URINALYSIS AUTO W/O SCOPE: CPT | Mod: QW,,, | Performed by: CLINICAL MEDICAL LABORATORY

## 2022-06-08 PROCEDURE — 84443 ASSAY THYROID STIM HORMONE: CPT | Mod: ,,, | Performed by: CLINICAL MEDICAL LABORATORY

## 2022-06-08 PROCEDURE — 3008F BODY MASS INDEX DOCD: CPT | Mod: ,,, | Performed by: REGISTERED NURSE

## 2022-06-08 PROCEDURE — 81003 URINALYSIS, REFLEX TO URINE CULTURE: ICD-10-PCS | Mod: QW,,, | Performed by: CLINICAL MEDICAL LABORATORY

## 2022-06-08 PROCEDURE — 1160F PR REVIEW ALL MEDS BY PRESCRIBER/CLIN PHARMACIST DOCUMENTED: ICD-10-PCS | Mod: ,,, | Performed by: REGISTERED NURSE

## 2022-06-08 PROCEDURE — 3078F PR MOST RECENT DIASTOLIC BLOOD PRESSURE < 80 MM HG: ICD-10-PCS | Mod: ,,, | Performed by: REGISTERED NURSE

## 2022-06-08 PROCEDURE — 4010F ACE/ARB THERAPY RXD/TAKEN: CPT | Mod: ,,, | Performed by: REGISTERED NURSE

## 2022-06-08 PROCEDURE — 99214 PR OFFICE/OUTPT VISIT, EST, LEVL IV, 30-39 MIN: ICD-10-PCS | Mod: ,,, | Performed by: REGISTERED NURSE

## 2022-06-08 RX ORDER — BRIMONIDINE TARTRATE, TIMOLOL MALEATE 2; 5 MG/ML; MG/ML
1 SOLUTION/ DROPS OPHTHALMIC 2 TIMES DAILY
COMMUNITY
Start: 2022-05-24

## 2022-06-08 RX ORDER — GLIPIZIDE AND METFORMIN HCL 5; 500 MG/1; MG/1
2 TABLET, FILM COATED ORAL
Qty: 360 TABLET | Refills: 1 | Status: SHIPPED | OUTPATIENT
Start: 2022-06-08 | End: 2022-11-09 | Stop reason: SDUPTHER

## 2022-06-08 RX ORDER — ESOMEPRAZOLE MAGNESIUM 40 MG/1
40 CAPSULE, DELAYED RELEASE ORAL DAILY
Qty: 90 CAPSULE | Refills: 1 | Status: SHIPPED | OUTPATIENT
Start: 2022-06-08 | End: 2022-11-09 | Stop reason: SDUPTHER

## 2022-06-08 RX ORDER — HYDRALAZINE HYDROCHLORIDE 25 MG/1
25 TABLET, FILM COATED ORAL 3 TIMES DAILY
Qty: 270 TABLET | Refills: 1 | Status: SHIPPED | OUTPATIENT
Start: 2022-06-08 | End: 2022-11-09 | Stop reason: SDUPTHER

## 2022-06-08 RX ORDER — ESCITALOPRAM OXALATE 20 MG/1
20 TABLET ORAL DAILY
Qty: 90 TABLET | Refills: 1 | Status: SHIPPED | OUTPATIENT
Start: 2022-06-08 | End: 2023-03-23 | Stop reason: SDUPTHER

## 2022-06-08 RX ORDER — GLIPIZIDE AND METFORMIN HCL 5; 500 MG/1; MG/1
TABLET, FILM COATED ORAL
Qty: 360 TABLET | Refills: 1 | OUTPATIENT
Start: 2022-06-08

## 2022-06-08 RX ORDER — ATORVASTATIN CALCIUM 40 MG/1
40 TABLET, FILM COATED ORAL DAILY
Qty: 90 TABLET | Refills: 1 | Status: SHIPPED | OUTPATIENT
Start: 2022-06-08 | End: 2023-04-04

## 2022-06-08 RX ORDER — VALSARTAN 160 MG/1
160 TABLET ORAL DAILY
Qty: 90 TABLET | Refills: 1 | Status: SHIPPED | OUTPATIENT
Start: 2022-06-08 | End: 2022-11-09 | Stop reason: SDUPTHER

## 2022-06-08 RX ORDER — METOPROLOL SUCCINATE 25 MG/1
25 TABLET, EXTENDED RELEASE ORAL DAILY
Qty: 90 TABLET | Refills: 1 | Status: SHIPPED | OUTPATIENT
Start: 2022-06-08 | End: 2023-08-01 | Stop reason: SDUPTHER

## 2022-06-08 RX ORDER — DAPAGLIFLOZIN 10 MG/1
10 TABLET, FILM COATED ORAL DAILY
Qty: 90 TABLET | Refills: 1 | Status: SHIPPED | OUTPATIENT
Start: 2022-06-08 | End: 2022-11-09 | Stop reason: SDUPTHER

## 2022-06-08 RX ORDER — LEVOTHYROXINE SODIUM 75 UG/1
75 TABLET ORAL EVERY MORNING
Qty: 90 TABLET | Refills: 1 | Status: SHIPPED | OUTPATIENT
Start: 2022-06-08 | End: 2022-11-09 | Stop reason: SDUPTHER

## 2022-06-08 NOTE — PROGRESS NOTES
KENZIE Mercado        PATIENT NAME: Estefania Urias  : 1955  DATE: 22  MRN: [id number]      Billing Provider: KENZIE Mercado  Level of Service:   Patient PCP Information     Provider PCP Type    KENZIE Mercado General          Reason for Visit / Chief Complaint: Medication Refill       Update PCP  Update Chief Complaint         History of Present Illness / Problem Focused Workflow     HPI Mrs. Urias is a 67 y/o AAF here for diabetes, hypertension, and hypothyroidism. She is due for lab work and needs medication refills. Mrs. Mac reports she has seen Cardiologist in the past but has she does not go anymore. She is vague about medical history, only answers when asked a question. No complaints or concerns voiced at this time. She has history of Graves disease and has seen Optometrist for eye symptoms.     Review of Systems     Review of Systems   Constitutional: Negative.    Eyes: Positive for redness and eye dryness.   Respiratory: Negative.    Cardiovascular: Negative.    Gastrointestinal: Negative.    Neurological: Negative.    Psychiatric/Behavioral: Negative.         Medical / Social / Family History     Past Medical History:   Diagnosis Date    Diabetes mellitus, type 2     Hypertension        Past Surgical History:   Procedure Laterality Date    HYSTERECTOMY         Social History  Ms. Estefania Urias  reports that she has never smoked. She has never used smokeless tobacco. She reports that she does not drink alcohol and does not use drugs.    Family History  Ms. Estefania Urias's family history is not on file.    Medications and Allergies     Medications  Outpatient Medications Marked as Taking for the 22 encounter (Office Visit) with KENZIE Mercado   Medication Sig Dispense Refill    aspirin (ECOTRIN) 81 MG EC tablet Take 81 mg by mouth once daily.      cetirizine (ZYRTEC) 10 MG tablet Take 1 tablet (10 mg total) by mouth nightly. 90 tablet 1     "COMBIGAN 0.2-0.5 % Drop Place 1 drop into both eyes 2 (two) times daily.      latanoprost 0.005 % ophthalmic solution Place 1 drop into both eyes nightly.      ULTICARE PEN NEEDLE 32 gauge x 5/32" Ndle INJECT LEVEMIR AS DIRECTED 100 each 3    [DISCONTINUED] atorvastatin (LIPITOR) 40 MG tablet Take 1 tablet (40 mg total) by mouth once daily. 90 tablet 1    [DISCONTINUED] EScitalopram oxalate (LEXAPRO) 20 MG tablet Take 1 tablet (20 mg total) by mouth once daily. 30 tablet 3    [DISCONTINUED] esomeprazole (NEXIUM) 40 MG capsule Take 1 capsule (40 mg total) by mouth once daily. 90 capsule 1    [DISCONTINUED] FARXIGA 10 mg tablet Take 1 tablet (10 mg total) by mouth once daily. 90 tablet 1    [DISCONTINUED] hydrALAZINE (APRESOLINE) 25 MG tablet Take 1 tablet (25 mg total) by mouth 3 (three) times daily. 90 tablet 1    [DISCONTINUED] insulin detemir U-100 (LEVEMIR) 100 unit/mL injection Inject 20 Units into the skin once daily. 15 mL 1    [DISCONTINUED] levothyroxine (SYNTHROID) 75 MCG tablet Take 1 tablet (75 mcg total) by mouth every morning. 30 tablet 5    [DISCONTINUED] metoprolol succinate (TOPROL-XL) 25 MG 24 hr tablet Take 1 tablet (25 mg total) by mouth once daily. 90 tablet 1    [DISCONTINUED] valsartan (DIOVAN) 160 MG tablet Take 1 tablet (160 mg total) by mouth once daily. 90 tablet 1       Allergies  Review of patient's allergies indicates:  No Known Allergies    Physical Examination     Vitals:    06/08/22 1413   BP: (!) 152/73   Pulse: 62   Temp: 97.7 °F (36.5 °C)     Physical Exam  Vitals and nursing note reviewed.   Constitutional:       Appearance: She is obese.   HENT:      Head: Normocephalic and atraumatic.   Eyes:      Comments: Bulging eyes, sensitivity to light.   Cardiovascular:      Rate and Rhythm: Normal rate and regular rhythm.      Heart sounds: Normal heart sounds.   Pulmonary:      Effort: Pulmonary effort is normal.      Breath sounds: Normal breath sounds.   Musculoskeletal:    "      General: Normal range of motion.      Cervical back: Normal range of motion.   Skin:     General: Skin is warm and dry.   Neurological:      Mental Status: She is alert and oriented to person, place, and time.   Psychiatric:         Behavior: Behavior normal.        Assessment and Plan (including Health Maintenance)      Problem List  Smart Sets  Document Outside HM   Plan:   Type 2 diabetes mellitus without complication, with long-term current use of insulin  -     Hemoglobin A1C; Future; Expected date: 06/08/2022  -     FARXIGA 10 mg tablet; Take 1 tablet (10 mg total) by mouth once daily.  Dispense: 90 tablet; Refill: 1  -     glipizide-metformin (METAGLIP) 5-500 mg per tablet; Take 2 tablets by mouth 2 (two) times daily before meals.  Dispense: 360 tablet; Refill: 1  -     insulin detemir U-100 (LEVEMIR) 100 unit/mL injection; Inject 20 Units into the skin once daily.  Dispense: 18 mL; Refill: 1    Essential hypertension  -     CBC Auto Differential; Future; Expected date: 06/08/2022  -     Comprehensive Metabolic Panel; Future; Expected date: 06/08/2022  -     Microalbumin/Creatinine Ratio, Urine; Future; Expected date: 06/08/2022  -     Lipid Panel; Future; Expected date: 06/08/2022  -     Urinalysis, Reflex to Urine Culture; Future; Expected date: 06/08/2022  -     metoprolol succinate (TOPROL-XL) 25 MG 24 hr tablet; Take 1 tablet (25 mg total) by mouth once daily.  Dispense: 90 tablet; Refill: 1  -     hydrALAZINE (APRESOLINE) 25 MG tablet; Take 1 tablet (25 mg total) by mouth 3 (three) times daily.  Dispense: 270 tablet; Refill: 1    Hypothyroidism, unspecified type  -     TSH; Future; Expected date: 06/08/2022  -     levothyroxine (SYNTHROID) 75 MCG tablet; Take 1 tablet (75 mcg total) by mouth every morning.  Dispense: 90 tablet; Refill: 1    Need for hepatitis C screening test  -     Hepatitis C Antibody; Future; Expected date: 06/08/2022    Depression, unspecified depression type  -      EScitalopram oxalate (LEXAPRO) 20 MG tablet; Take 1 tablet (20 mg total) by mouth once daily.  Dispense: 90 tablet; Refill: 1    Dyslipidemia  -     atorvastatin (LIPITOR) 40 MG tablet; Take 1 tablet (40 mg total) by mouth once daily.  Dispense: 90 tablet; Refill: 1    Gastroesophageal reflux disease, unspecified whether esophagitis present  -     esomeprazole (NEXIUM) 40 MG capsule; Take 1 capsule (40 mg total) by mouth once daily.  Dispense: 90 capsule; Refill: 1    Vitamin D deficiency, unspecified  -     Vitamin D; Future; Expected date: 06/08/2022    Other orders  -     valsartan (DIOVAN) 160 MG tablet; Take 1 tablet (160 mg total) by mouth once daily.  Dispense: 90 tablet; Refill: 1       Health Maintenance Due   Topic Date Due    Hepatitis C Screening  Never done    Foot Exam  Never done    Eye Exam  Never done    HIV Screening  Never done    TETANUS VACCINE  Never done    Mammogram  Never done    Colorectal Cancer Screening  Never done    Shingles Vaccine (1 of 2) Never done    COVID-19 Vaccine (4 - Booster for Pfizer series) 02/08/2022    Lipid Panel  05/18/2022       Problem List Items Addressed This Visit        Cardiac/Vascular    Essential hypertension    Relevant Medications    metoprolol succinate (TOPROL-XL) 25 MG 24 hr tablet    hydrALAZINE (APRESOLINE) 25 MG tablet    Other Relevant Orders    CBC Auto Differential    Comprehensive Metabolic Panel    Microalbumin/Creatinine Ratio, Urine    Lipid Panel    Urinalysis, Reflex to Urine Culture       Endocrine    Type 2 diabetes mellitus without complication, with long-term current use of insulin - Primary    Relevant Medications    FARXIGA 10 mg tablet    glipizide-metformin (METAGLIP) 5-500 mg per tablet    insulin detemir U-100 (LEVEMIR) 100 unit/mL injection    Other Relevant Orders    Hemoglobin A1C    Vitamin D deficiency, unspecified    Relevant Orders    Vitamin D      Other Visit Diagnoses     Hypothyroidism, unspecified type         Relevant Medications    levothyroxine (SYNTHROID) 75 MCG tablet    Other Relevant Orders    TSH    Need for hepatitis C screening test        Relevant Orders    Hepatitis C Antibody    Depression, unspecified depression type        Relevant Medications    EScitalopram oxalate (LEXAPRO) 20 MG tablet    Dyslipidemia        Relevant Medications    atorvastatin (LIPITOR) 40 MG tablet    Gastroesophageal reflux disease, unspecified whether esophagitis present        Relevant Medications    esomeprazole (NEXIUM) 40 MG capsule          Health Maintenance Topics with due status: Not Due       Topic Last Completion Date    Diabetes Urine Screening 12/16/2021    Hemoglobin A1c 12/16/2021    Low Dose Statin 06/08/2022    Influenza Vaccine Not Due       Future Appointments   Date Time Provider Department Center   6/24/2022  9:00 AM AWV NURSE, [facility] PRIMARY CARE [facility] JOSEMANUEL Isaac        Patient Instructions    We will call you with lab results when available.     Take all medications as prescribed. Do not stop or start any medication(s) without consulting with your primary care provider or specialist. Make sure you are drinking plenty of water unless on a fluid restriction.   Cut salt intake, try to restrict sodium to 2 grams or less. Avoid fried foods and eat carbohydrates such as bread, pasta, and potatoes in moderation.    Follow up in about 5 months (around 11/8/2022), or if symptoms worsen or fail to improve.     Signature:  KENZIE Mercado      Date of encounter: 6/8/22

## 2022-06-08 NOTE — PATIENT INSTRUCTIONS
We will call you with lab results when available.     Take all medications as prescribed. Do not stop or start any medication(s) without consulting with your primary care provider or specialist. Make sure you are drinking plenty of water unless on a fluid restriction.   Cut salt intake, try to restrict sodium to 2 grams or less. Avoid fried foods and eat carbohydrates such as bread, pasta, and potatoes in moderation.

## 2022-06-09 LAB
25(OH)D3 SERPL-MCNC: 25.1 NG/ML
ALBUMIN SERPL BCP-MCNC: 3.5 G/DL (ref 3.5–5)
ALBUMIN/GLOB SERPL: 0.8 {RATIO}
ALP SERPL-CCNC: 91 U/L (ref 50–130)
ALT SERPL W P-5'-P-CCNC: 11 U/L (ref 13–56)
ANION GAP SERPL CALCULATED.3IONS-SCNC: 13 MMOL/L (ref 7–16)
AST SERPL W P-5'-P-CCNC: 8 U/L (ref 15–37)
BASOPHILS # BLD AUTO: 0.06 K/UL (ref 0–0.2)
BASOPHILS NFR BLD AUTO: 0.6 % (ref 0–1)
BILIRUB SERPL-MCNC: 0.4 MG/DL (ref 0–1.2)
BILIRUB UR QL STRIP: NEGATIVE
BUN SERPL-MCNC: 13 MG/DL (ref 7–18)
BUN/CREAT SERPL: 14 (ref 6–20)
CALCIUM SERPL-MCNC: 8.9 MG/DL (ref 8.5–10.1)
CHLORIDE SERPL-SCNC: 105 MMOL/L (ref 98–107)
CHOLEST SERPL-MCNC: 116 MG/DL (ref 0–200)
CHOLEST/HDLC SERPL: 3.3 {RATIO}
CLARITY UR: CLEAR
CO2 SERPL-SCNC: 25 MMOL/L (ref 21–32)
COLOR UR: YELLOW
CREAT SERPL-MCNC: 0.92 MG/DL (ref 0.55–1.02)
DIFFERENTIAL METHOD BLD: ABNORMAL
EOSINOPHIL # BLD AUTO: 0.17 K/UL (ref 0–0.5)
EOSINOPHIL NFR BLD AUTO: 1.8 % (ref 1–4)
ERYTHROCYTE [DISTWIDTH] IN BLOOD BY AUTOMATED COUNT: 14.6 % (ref 11.5–14.5)
EST. AVERAGE GLUCOSE BLD GHB EST-MCNC: 167 MG/DL
GLOBULIN SER-MCNC: 4.2 G/DL (ref 2–4)
GLUCOSE SERPL-MCNC: 113 MG/DL (ref 74–106)
GLUCOSE UR STRIP-MCNC: >=1000 MG/DL
HBA1C MFR BLD HPLC: 7.6 % (ref 4.5–6.6)
HCT VFR BLD AUTO: 39.9 % (ref 38–47)
HCV AB SER QL: NORMAL
HDLC SERPL-MCNC: 35 MG/DL (ref 40–60)
HGB BLD-MCNC: 12.6 G/DL (ref 12–16)
IMM GRANULOCYTES # BLD AUTO: 0.04 K/UL (ref 0–0.04)
IMM GRANULOCYTES NFR BLD: 0.4 % (ref 0–0.4)
KETONES UR STRIP-SCNC: NEGATIVE MG/DL
LDLC SERPL CALC-MCNC: 58 MG/DL
LDLC/HDLC SERPL: 1.7 {RATIO}
LEUKOCYTE ESTERASE UR QL STRIP: NEGATIVE
LYMPHOCYTES # BLD AUTO: 3.44 K/UL (ref 1–4.8)
LYMPHOCYTES NFR BLD AUTO: 36.4 % (ref 27–41)
MCH RBC QN AUTO: 27.6 PG (ref 27–31)
MCHC RBC AUTO-ENTMCNC: 31.6 G/DL (ref 32–36)
MCV RBC AUTO: 87.5 FL (ref 80–96)
MONOCYTES # BLD AUTO: 0.52 K/UL (ref 0–0.8)
MONOCYTES NFR BLD AUTO: 5.5 % (ref 2–6)
MPC BLD CALC-MCNC: 11.5 FL (ref 9.4–12.4)
NEUTROPHILS # BLD AUTO: 5.21 K/UL (ref 1.8–7.7)
NEUTROPHILS NFR BLD AUTO: 55.3 % (ref 53–65)
NITRITE UR QL STRIP: NEGATIVE
NONHDLC SERPL-MCNC: 81 MG/DL
NRBC # BLD AUTO: 0 X10E3/UL
NRBC, AUTO (.00): 0 %
PH UR STRIP: 6 PH UNITS
PLATELET # BLD AUTO: 337 K/UL (ref 150–400)
POTASSIUM SERPL-SCNC: 4 MMOL/L (ref 3.5–5.1)
PROT SERPL-MCNC: 7.7 G/DL (ref 6.4–8.2)
PROT UR QL STRIP: NEGATIVE
RBC # BLD AUTO: 4.56 M/UL (ref 4.2–5.4)
RBC # UR STRIP: NEGATIVE /UL
SODIUM SERPL-SCNC: 139 MMOL/L (ref 136–145)
SP GR UR STRIP: <=1.005
TRIGL SERPL-MCNC: 114 MG/DL (ref 35–150)
TSH SERPL DL<=0.005 MIU/L-ACNC: 1.74 UIU/ML (ref 0.36–3.74)
UROBILINOGEN UR STRIP-ACNC: 0.2 MG/DL
VLDLC SERPL-MCNC: 23 MG/DL
WBC # BLD AUTO: 9.44 K/UL (ref 4.5–11)

## 2022-06-10 LAB
CREAT UR-MCNC: <13 MG/DL (ref 28–219)
MICROALBUMIN UR-MCNC: 0.5 MG/DL (ref 0–2.8)
MICROALBUMIN/CREAT RATIO PNL UR: ABNORMAL

## 2022-06-14 ENCOUNTER — TELEPHONE (OUTPATIENT)
Dept: FAMILY MEDICINE | Facility: CLINIC | Age: 67
End: 2022-06-14
Payer: COMMERCIAL

## 2022-06-14 NOTE — TELEPHONE ENCOUNTER
----- Message from Heidi Londono LPN sent at 6/14/2022  1:11 PM CDT -----    ----- Message -----  From: KENZIE Mercado  Sent: 6/13/2022   7:25 PM CDT  To: Heidi Londono LPN    Please let Mrs. Urias know her lab work looks okay. Her A1C is 7.6 which is about what it was last time. It does not need to be any higher than this. Her urine is clear and her cholesterol is okay for now. Her vitamin d level is 25, which is normal. No changes need to be made at this time. She needs to make sure she is taking her diabetic medications and monitoring her blood sugar. Encourage her to drink plenty of water.

## 2022-06-24 ENCOUNTER — OFFICE VISIT (OUTPATIENT)
Dept: FAMILY MEDICINE | Facility: CLINIC | Age: 67
End: 2022-06-24
Payer: COMMERCIAL

## 2022-06-24 VITALS
TEMPERATURE: 97 F | DIASTOLIC BLOOD PRESSURE: 71 MMHG | HEIGHT: 70 IN | BODY MASS INDEX: 31.79 KG/M2 | HEART RATE: 56 BPM | WEIGHT: 222.06 LBS | RESPIRATION RATE: 20 BRPM | OXYGEN SATURATION: 98 % | SYSTOLIC BLOOD PRESSURE: 140 MMHG

## 2022-06-24 DIAGNOSIS — Z79.4 TYPE 2 DIABETES MELLITUS WITHOUT COMPLICATION, WITH LONG-TERM CURRENT USE OF INSULIN: ICD-10-CM

## 2022-06-24 DIAGNOSIS — Z00.00 ENCOUNTER FOR SUBSEQUENT ANNUAL WELLNESS VISIT (AWV) IN MEDICARE PATIENT: Primary | ICD-10-CM

## 2022-06-24 DIAGNOSIS — E78.5 DYSLIPIDEMIA: ICD-10-CM

## 2022-06-24 DIAGNOSIS — E11.9 TYPE 2 DIABETES MELLITUS WITHOUT COMPLICATION, WITH LONG-TERM CURRENT USE OF INSULIN: ICD-10-CM

## 2022-06-24 DIAGNOSIS — Z12.12 ENCOUNTER FOR SCREENING FOR COLORECTAL MALIGNANT NEOPLASM: ICD-10-CM

## 2022-06-24 DIAGNOSIS — I10 ESSENTIAL HYPERTENSION: ICD-10-CM

## 2022-06-24 DIAGNOSIS — K21.9 GASTROESOPHAGEAL REFLUX DISEASE, UNSPECIFIED WHETHER ESOPHAGITIS PRESENT: ICD-10-CM

## 2022-06-24 DIAGNOSIS — E03.9 HYPOTHYROIDISM, UNSPECIFIED TYPE: ICD-10-CM

## 2022-06-24 DIAGNOSIS — Z12.11 ENCOUNTER FOR SCREENING FOR COLORECTAL MALIGNANT NEOPLASM: ICD-10-CM

## 2022-06-24 DIAGNOSIS — F32.A DEPRESSION, UNSPECIFIED DEPRESSION TYPE: ICD-10-CM

## 2022-06-24 DIAGNOSIS — Z12.31 ENCOUNTER FOR SCREENING MAMMOGRAM FOR MALIGNANT NEOPLASM OF BREAST: ICD-10-CM

## 2022-06-24 PROCEDURE — G0439 PPPS, SUBSEQ VISIT: HCPCS | Mod: ,,, | Performed by: REGISTERED NURSE

## 2022-06-24 PROCEDURE — 3077F PR MOST RECENT SYSTOLIC BLOOD PRESSURE >= 140 MM HG: ICD-10-PCS | Mod: ,,, | Performed by: REGISTERED NURSE

## 2022-06-24 PROCEDURE — 4010F ACE/ARB THERAPY RXD/TAKEN: CPT | Mod: ,,, | Performed by: REGISTERED NURSE

## 2022-06-24 PROCEDURE — 3077F SYST BP >= 140 MM HG: CPT | Mod: ,,, | Performed by: REGISTERED NURSE

## 2022-06-24 PROCEDURE — 1160F RVW MEDS BY RX/DR IN RCRD: CPT | Mod: ,,, | Performed by: REGISTERED NURSE

## 2022-06-24 PROCEDURE — 3078F DIAST BP <80 MM HG: CPT | Mod: ,,, | Performed by: REGISTERED NURSE

## 2022-06-24 PROCEDURE — 1159F MED LIST DOCD IN RCRD: CPT | Mod: ,,, | Performed by: REGISTERED NURSE

## 2022-06-24 PROCEDURE — 3008F PR BODY MASS INDEX (BMI) DOCUMENTED: ICD-10-PCS | Mod: ,,, | Performed by: REGISTERED NURSE

## 2022-06-24 PROCEDURE — 3051F PR MOST RECENT HEMOGLOBIN A1C LEVEL 7.0 - < 8.0%: ICD-10-PCS | Mod: ,,, | Performed by: REGISTERED NURSE

## 2022-06-24 PROCEDURE — 3051F HG A1C>EQUAL 7.0%<8.0%: CPT | Mod: ,,, | Performed by: REGISTERED NURSE

## 2022-06-24 PROCEDURE — 3078F PR MOST RECENT DIASTOLIC BLOOD PRESSURE < 80 MM HG: ICD-10-PCS | Mod: ,,, | Performed by: REGISTERED NURSE

## 2022-06-24 PROCEDURE — G0439 PR MEDICARE ANNUAL WELLNESS SUBSEQUENT VISIT: ICD-10-PCS | Mod: ,,, | Performed by: REGISTERED NURSE

## 2022-06-24 PROCEDURE — 3008F BODY MASS INDEX DOCD: CPT | Mod: ,,, | Performed by: REGISTERED NURSE

## 2022-06-24 PROCEDURE — 1159F PR MEDICATION LIST DOCUMENTED IN MEDICAL RECORD: ICD-10-PCS | Mod: ,,, | Performed by: REGISTERED NURSE

## 2022-06-24 PROCEDURE — 4010F PR ACE/ARB THEARPY RXD/TAKEN: ICD-10-PCS | Mod: ,,, | Performed by: REGISTERED NURSE

## 2022-06-24 PROCEDURE — 1160F PR REVIEW ALL MEDS BY PRESCRIBER/CLIN PHARMACIST DOCUMENTED: ICD-10-PCS | Mod: ,,, | Performed by: REGISTERED NURSE

## 2022-06-24 RX ORDER — CETIRIZINE HYDROCHLORIDE 10 MG/1
10 TABLET ORAL NIGHTLY
Qty: 90 TABLET | Refills: 1 | Status: SHIPPED | OUTPATIENT
Start: 2022-06-24 | End: 2023-05-01 | Stop reason: SDUPTHER

## 2022-06-24 RX ORDER — INSULIN DETEMIR 100 [IU]/ML
20 INJECTION, SOLUTION SUBCUTANEOUS NIGHTLY
Qty: 6 ML | Refills: 2 | Status: SHIPPED | OUTPATIENT
Start: 2022-06-24 | End: 2022-08-10 | Stop reason: SDUPTHER

## 2022-06-24 NOTE — PROGRESS NOTES
[facility]     PATIENT NAME: Estefania Urias   : 1955    AGE: 66 y.o. DATE: 2022   MRN: [id number]        Reason for Visit / Chief Complaint: Medicare AWV ([employer] SUBS AWV )        Estefania Urias presents for a Subsequent Medicare AWV today.     The following components were reviewed and updated:    Medical/Social/Family History:  Past Medical History:   Diagnosis Date    Diabetes mellitus, type 2     Hyperlipidemia     Hypertension         Family History   Problem Relation Age of Onset    Diabetes Mother     Heart disease Father         Social History     Tobacco Use   Smoking Status Never Smoker   Smokeless Tobacco Never Used      Social History     Substance and Sexual Activity   Alcohol Use Never       Family History   Problem Relation Age of Onset    Diabetes Mother     Heart disease Father        Past Surgical History:   Procedure Laterality Date    HYSTERECTOMY           · Allergies and Current Medications   Review of patient's allergies indicates:  No Known Allergies    Current Outpatient Medications:     aspirin (ECOTRIN) 81 MG EC tablet, Take 81 mg by mouth once daily., Disp: , Rfl:     atorvastatin (LIPITOR) 40 MG tablet, Take 1 tablet (40 mg total) by mouth once daily., Disp: 90 tablet, Rfl: 1    cetirizine (ZYRTEC) 10 MG tablet, Take 1 tablet (10 mg total) by mouth nightly., Disp: 90 tablet, Rfl: 1    COMBIGAN 0.2-0.5 % Drop, Place 1 drop into both eyes 2 (two) times daily., Disp: , Rfl:     EScitalopram oxalate (LEXAPRO) 20 MG tablet, Take 1 tablet (20 mg total) by mouth once daily., Disp: 90 tablet, Rfl: 1    esomeprazole (NEXIUM) 40 MG capsule, Take 1 capsule (40 mg total) by mouth once daily., Disp: 90 capsule, Rfl: 1    FARXIGA 10 mg tablet, Take 1 tablet (10 mg total) by mouth once daily., Disp: 90 tablet, Rfl: 1    glipizide-metformin (METAGLIP) 5-500 mg per tablet, Take 2 tablets by mouth 2 (two) times daily before meals., Disp:  "360 tablet, Rfl: 1    hydrALAZINE (APRESOLINE) 25 MG tablet, Take 1 tablet (25 mg total) by mouth 3 (three) times daily., Disp: 270 tablet, Rfl: 1    insulin detemir U-100 (LEVEMIR) 100 unit/mL injection, Inject 20 Units into the skin once daily., Disp: 18 mL, Rfl: 1    latanoprost 0.005 % ophthalmic solution, Place 1 drop into both eyes nightly., Disp: , Rfl:     levothyroxine (SYNTHROID) 75 MCG tablet, Take 1 tablet (75 mcg total) by mouth every morning., Disp: 90 tablet, Rfl: 1    metoprolol succinate (TOPROL-XL) 25 MG 24 hr tablet, Take 1 tablet (25 mg total) by mouth once daily., Disp: 90 tablet, Rfl: 1    valsartan (DIOVAN) 160 MG tablet, Take 1 tablet (160 mg total) by mouth once daily., Disp: 90 tablet, Rfl: 1    ULTICARE PEN NEEDLE 32 gauge x 5/32" Ndle, INJECT LEVEMIR AS DIRECTED, Disp: 100 each, Rfl: 3    · Health Risk Assessment   Fall Risk: No   Obesity: BMI 31.87      Advance Directive:  Does not have an advanced directive. Verbal education and written education included in today's AVS.   Depression: PHQ 9 score: 0    HTN:  yes, DASH diet, exercise, weight management, med compliance, home BP monitoring, and follow-up discussed.   T2DM: yes, diabetic diet, glucose monitoring, activity level, weight management, med compliance, and follow-up discussed.   Tobacco use: Denies  STI: NA    Alcohol misuse: Denies   Statin Use: Yes      · Health Risk Assessment  What is your age?:  (66)  Are you male or female?: Female  During the past four weeks, how much have you been bothered by emotional problems such as feeling anxious, depressed, irritable, sad, or downhearted and blue?: Not at all  During the past five weeks, has your physical and/or emotional health limited your social activities with family, friends, neighbors, or groups?: Not at all  During the past four weeks, how much bodily pain have you generally had?: Mild pain  During the past four weeks, was someone available to help if you needed and " wanted help?: Yes, as much as I wanted  During the past four weeks, what was the hardest physical activity you could do for at least two minutes?: Very light  Can you get to places out of walking distance without help?  (For example, can you travel alone on buses or taxis, or drive your own car?): Yes  Can you go shopping for groceries or clothes without someone's help?: No  Can you prepare your own meals?: Yes  Can you do your own housework without help?: Yes  Because of any health problems, do you need the help of another person with your personal care needs such as eating, bathing, dressing, or getting around the house?: No  Can you handle your own money without help?: No  During the past four weeks, how would you rate your health in general?: Good  How have things been going for you during the past four weeks?: Pretty well  Are you having difficulties driving your car?: Not applicable, I do not use a car  Do you always fasten your seat belt when you are in a car?: Yes, usually  How often in the past four weeks have you been bothered by falling or dizzy when standing up?: Sometimes  How often in the past four weeks have you been bothered by sexual problems?: Never  How often in the past four weeks have you been bothered by trouble eating well?: Never  How often in the past four weeks have you been bothered by teeth or denture problems?: Never  How often in the past four weeks have you been bothered with problems using the telephone?: Never  How often in the past four weeks have you been bothered by tiredness or fatigue?: Often  Have you fallen two or more times in the past year?: No  Are you afraid of falling?: Yes  Are you a smoker?: No  During the past four weeks, how many drinks of wine, beer, or other alcoholic beverages did you have?: No alcohol at all  Do you exercise for about 20 minutes three or more days a week?: Yes, some of the time  Have you been given any information to help you with hazards in your  house that might hurt you?: No  Have you been given any information to help you with keeping track of your medications?: No  How often do you have trouble taking medicines the way you've been told to take them?: I always take them as prescribed  How confident are you that you can control and manage most of your health problems?: Somewhat confident  What is your race? (Check all that apply.):     · Health Maintenance   Last eye exam: 05/2022 will request records   Last CV screen with lipids: 06/08/2022     Diabetes screening with fasting glucose or A1c: 06/08/2022   Colonoscopy: Due   Flu Vaccine: NA   Pneumonia vaccines: 03/26/2019-23   COVID vaccine: Pfizer 02/24/2021 03/23/2021 10/08/2021 06/10/2022   Hep B vaccine: NA   DEXA: NA   Last pap/pelvic: Pt had total hysterectomy   Last Mammogram: Due   Last PSA screen: NA   AAA screening: NA (once in lifetime for males 65-75 who have smoked > 100 cigarettes in lifetime)  HIV Screeing: Declined  Hepatitis C Screen: 06/08/2022  Low Dose CT Scan: NA    Health Maintenance Topics with due status: Not Due       Topic Last Completion Date    Low Dose Statin 06/08/2022    Diabetes Urine Screening 06/08/2022    Lipid Panel 06/08/2022    Hemoglobin A1c 06/08/2022    Influenza Vaccine Not Due     Health Maintenance Due   Topic Date Due    Foot Exam  Never done    Eye Exam  Never done    Mammogram  Never done    Colorectal Cancer Screening  Never done        Incontinence  Bowel: No  Bladder: No    Lab results available in Epic or see dates from [facility] above:   Lab Results   Component Value Date    CHOL 116 06/08/2022    CHOL 115 05/18/2021     Lab Results   Component Value Date    HDL 35 (L) 06/08/2022    HDL 37 (L) 05/18/2021     Lab Results   Component Value Date    LDLCALC 58 06/08/2022    LDLCALC 55 05/18/2021     Lab Results   Component Value Date    TRIG 114 06/08/2022    TRIG 117 05/18/2021     Lab Results   Component Value Date    CHOLHDL 3.3 06/08/2022     CHOLHDL 3.1 05/18/2021       Lab Results   Component Value Date    HGBA1C 7.6 (H) 06/08/2022       Sodium   Date Value Ref Range Status   06/08/2022 139 136 - 145 mmol/L Final     Potassium   Date Value Ref Range Status   06/08/2022 4.0 3.5 - 5.1 mmol/L Final     Chloride   Date Value Ref Range Status   06/08/2022 105 98 - 107 mmol/L Final     CO2   Date Value Ref Range Status   06/08/2022 25 21 - 32 mmol/L Final     Glucose   Date Value Ref Range Status   06/08/2022 113 (H) 74 - 106 mg/dL Final     BUN   Date Value Ref Range Status   06/08/2022 13 7 - 18 mg/dL Final     Creatinine   Date Value Ref Range Status   06/08/2022 0.92 0.55 - 1.02 mg/dL Final     Calcium   Date Value Ref Range Status   06/08/2022 8.9 8.5 - 10.1 mg/dL Final     Total Protein   Date Value Ref Range Status   06/08/2022 7.7 6.4 - 8.2 g/dL Final     Albumin   Date Value Ref Range Status   06/08/2022 3.5 3.5 - 5.0 g/dL Final     Bilirubin, Total   Date Value Ref Range Status   06/08/2022 0.4 0.0 - 1.2 mg/dL Final     Alk Phos   Date Value Ref Range Status   06/08/2022 91 50 - 130 U/L Final     AST   Date Value Ref Range Status   06/08/2022 8 (L) 15 - 37 U/L Final     ALT   Date Value Ref Range Status   06/08/2022 11 (L) 13 - 56 U/L Final     Anion Gap   Date Value Ref Range Status   06/08/2022 13 7 - 16 mmol/L Final     eGFR    Date Value Ref Range Status   12/16/2021 99 >=60 mL/min/1.73m² Final     eGFR   Date Value Ref Range Status   06/08/2022 65 >=60 mL/min/1.73m² Final          See Completed Assessments for Annual Wellness visit within the encounter summary    The following assessments were completed & reviewed:  · Depression Screening  · Cognitive function Screening  · Timed Get Up Test  · Whisper Test  · Vision Screen  · Health Risk Assessment  · Checklist of ADLs and IADLs      Objective  Vitals:    06/24/22 0930   BP: (!) 140/71   Pulse: (!) 56   Resp: 20   Temp: 97.4 °F (36.3 °C)   SpO2: 98%   Weight: 100.7 kg (222  "lb 1.3 oz)   Height: 5' 10" (1.778 m)   PainSc: 0-No pain      Body mass index is 31.87 kg/m².  Ideal body weight: 68.5 kg (151 lb 0.2 oz)     Physical Exam  Vitals and nursing note reviewed.   Constitutional:       Appearance: Normal appearance.   HENT:      Head: Normocephalic and atraumatic.   Cardiovascular:      Rate and Rhythm: Normal rate and regular rhythm.      Heart sounds: Normal heart sounds.   Musculoskeletal:         General: Normal range of motion.      Cervical back: Normal range of motion.   Skin:     General: Skin is warm and dry.   Neurological:      Mental Status: She is alert and oriented to person, place, and time.   Psychiatric:         Behavior: Behavior normal.       Assessment:     1. Encounter for subsequent annual wellness visit (AWV) in Medicare patient     Plan:    Referrals:   Mammogram and Colonoscopy    Advised to call office if does not hear from anyone with referral appt within 2-3 weeks to check on status of referral. Voiced understanding.        Discussed and provided with a screening schedule and personal prevention plan in accordance with USPSTF age appropriate recommendations and Medicare screening guidelines.   Education, counseling, and referrals were provided as needed.  After Visit Summary printed and given to patient which includes written education and a list of any referrals if indicated.     Education including advanced directives, diet, exercise, falls, and preventive health discussed with patient and patient verbalized understanding.      F/u plan for yearly AWV.    Signature:     "

## 2022-06-24 NOTE — PATIENT INSTRUCTIONS
Counseling and Referral of Other Preventative  (Italic type indicates deductible and co-insurance are waived)    Patient Name: Estefania Urias  Today's Date: 6/24/2022    Health Maintenance         Date Due Completion Date    Foot Exam Never done ---    Eye Exam Never done ---    Mammogram Never done ---    Colorectal Cancer Screening Never done ---    TETANUS VACCINE 06/24/2023 (Originally 7/15/2020) 7/15/2010    Shingles Vaccine (1 of 2) 06/24/2023 (Originally 12/31/2007) ---    HIV Screening 06/24/2028 (Originally 12/31/1972) ---    Influenza Vaccine (Season Ended) 09/01/2022 ---    Hemoglobin A1c 12/08/2022 6/8/2022    Low Dose Statin 06/08/2023 6/8/2022    Diabetes Urine Screening 06/08/2023 6/8/2022    Lipid Panel 06/08/2023 6/8/2022          No orders of the defined types were placed in this encounter.

## 2022-08-10 ENCOUNTER — OFFICE VISIT (OUTPATIENT)
Dept: FAMILY MEDICINE | Facility: CLINIC | Age: 67
End: 2022-08-10
Payer: COMMERCIAL

## 2022-08-10 VITALS
BODY MASS INDEX: 32.07 KG/M2 | WEIGHT: 224 LBS | HEART RATE: 57 BPM | TEMPERATURE: 97 F | OXYGEN SATURATION: 96 % | SYSTOLIC BLOOD PRESSURE: 153 MMHG | DIASTOLIC BLOOD PRESSURE: 72 MMHG | HEIGHT: 70 IN

## 2022-08-10 DIAGNOSIS — J06.9 UPPER RESPIRATORY TRACT INFECTION, UNSPECIFIED TYPE: Primary | ICD-10-CM

## 2022-08-10 DIAGNOSIS — Z79.4 TYPE 2 DIABETES MELLITUS WITHOUT COMPLICATION, WITH LONG-TERM CURRENT USE OF INSULIN: ICD-10-CM

## 2022-08-10 DIAGNOSIS — E11.9 TYPE 2 DIABETES MELLITUS WITHOUT COMPLICATION, WITH LONG-TERM CURRENT USE OF INSULIN: ICD-10-CM

## 2022-08-10 PROCEDURE — 99213 OFFICE O/P EST LOW 20 MIN: CPT | Mod: ,,, | Performed by: REGISTERED NURSE

## 2022-08-10 PROCEDURE — 3008F PR BODY MASS INDEX (BMI) DOCUMENTED: ICD-10-PCS | Mod: ,,, | Performed by: REGISTERED NURSE

## 2022-08-10 PROCEDURE — 3051F HG A1C>EQUAL 7.0%<8.0%: CPT | Mod: ,,, | Performed by: REGISTERED NURSE

## 2022-08-10 PROCEDURE — 3077F SYST BP >= 140 MM HG: CPT | Mod: ,,, | Performed by: REGISTERED NURSE

## 2022-08-10 PROCEDURE — 3078F DIAST BP <80 MM HG: CPT | Mod: ,,, | Performed by: REGISTERED NURSE

## 2022-08-10 PROCEDURE — 1160F RVW MEDS BY RX/DR IN RCRD: CPT | Mod: ,,, | Performed by: REGISTERED NURSE

## 2022-08-10 PROCEDURE — 4010F ACE/ARB THERAPY RXD/TAKEN: CPT | Mod: ,,, | Performed by: REGISTERED NURSE

## 2022-08-10 PROCEDURE — 3077F PR MOST RECENT SYSTOLIC BLOOD PRESSURE >= 140 MM HG: ICD-10-PCS | Mod: ,,, | Performed by: REGISTERED NURSE

## 2022-08-10 PROCEDURE — 3008F BODY MASS INDEX DOCD: CPT | Mod: ,,, | Performed by: REGISTERED NURSE

## 2022-08-10 PROCEDURE — 3078F PR MOST RECENT DIASTOLIC BLOOD PRESSURE < 80 MM HG: ICD-10-PCS | Mod: ,,, | Performed by: REGISTERED NURSE

## 2022-08-10 PROCEDURE — 1160F PR REVIEW ALL MEDS BY PRESCRIBER/CLIN PHARMACIST DOCUMENTED: ICD-10-PCS | Mod: ,,, | Performed by: REGISTERED NURSE

## 2022-08-10 PROCEDURE — 4010F PR ACE/ARB THEARPY RXD/TAKEN: ICD-10-PCS | Mod: ,,, | Performed by: REGISTERED NURSE

## 2022-08-10 PROCEDURE — 1159F PR MEDICATION LIST DOCUMENTED IN MEDICAL RECORD: ICD-10-PCS | Mod: ,,, | Performed by: REGISTERED NURSE

## 2022-08-10 PROCEDURE — 1159F MED LIST DOCD IN RCRD: CPT | Mod: ,,, | Performed by: REGISTERED NURSE

## 2022-08-10 PROCEDURE — 99213 PR OFFICE/OUTPT VISIT, EST, LEVL III, 20-29 MIN: ICD-10-PCS | Mod: ,,, | Performed by: REGISTERED NURSE

## 2022-08-10 PROCEDURE — 3051F PR MOST RECENT HEMOGLOBIN A1C LEVEL 7.0 - < 8.0%: ICD-10-PCS | Mod: ,,, | Performed by: REGISTERED NURSE

## 2022-08-10 RX ORDER — BENZONATATE 100 MG/1
100 CAPSULE ORAL 3 TIMES DAILY PRN
Qty: 30 CAPSULE | Refills: 0 | Status: SHIPPED | OUTPATIENT
Start: 2022-08-10 | End: 2022-08-20

## 2022-08-10 RX ORDER — INSULIN DETEMIR 100 [IU]/ML
20 INJECTION, SOLUTION SUBCUTANEOUS NIGHTLY
Qty: 6 ML | Refills: 2 | Status: SHIPPED | OUTPATIENT
Start: 2022-08-10 | End: 2023-01-03 | Stop reason: SDUPTHER

## 2022-08-10 RX ORDER — AZITHROMYCIN 250 MG/1
TABLET, FILM COATED ORAL
Qty: 6 TABLET | Refills: 0 | Status: SHIPPED | OUTPATIENT
Start: 2022-08-10 | End: 2022-08-15

## 2022-08-10 RX ORDER — MONTELUKAST SODIUM 10 MG/1
10 TABLET ORAL NIGHTLY
Qty: 30 TABLET | Refills: 0 | Status: SHIPPED | OUTPATIENT
Start: 2022-08-10 | End: 2022-09-09

## 2022-08-10 RX ORDER — ALBUTEROL SULFATE 90 UG/1
2 AEROSOL, METERED RESPIRATORY (INHALATION) EVERY 6 HOURS PRN
Qty: 18 G | Refills: 0 | Status: SHIPPED | OUTPATIENT
Start: 2022-08-10 | End: 2023-02-14 | Stop reason: SDUPTHER

## 2022-08-10 NOTE — PROGRESS NOTES
KENZIE Mercado        PATIENT NAME: Estefania Urias  : 1955  DATE: 8/10/22  MRN: [id number]      Billing Provider: KENZIE Mercado  Level of Service: DC OFFICE/OUTPT VISIT, EST, LEVL III, 20-29 MIN  Patient PCP Information       Provider PCP Type    KENZIE Mercado General            Reason for Visit / Chief Complaint: would like insulin to be changed to pen       Update PCP  Update Chief Complaint         History of Present Illness / Problem Focused Workflow     HPI Mrs. Urias is a 67 y/o AAF here with complaints of cough, nasal congestion, and sore throat. Symptoms started 5 days ago. No fever, no nausea, vomiting, or diarrhea. She has Zyrtec ordered HS, states she has not been taking routinely. When she picked up prescription for Levemir last month, patient states she was given insulin syringes and a multi-dose vial. Patient states she knows how to use multi-dose vial but states she has difficulty seeing the lines on the syringes and feels clumsy trying to prepare the insulin for injection. Prescription sent to pharmacy per ISAK was same as previous prescription for Levemir Pens. Contacted pharmacy who states multi-dose vial was filled in error from a prescription left from previous pcp.     Review of Systems     Review of Systems   Constitutional:  Positive for fatigue. Negative for activity change, appetite change, chills and fever.   HENT:  Positive for nasal congestion, ear pain, postnasal drip, sinus pressure/congestion, sneezing and sore throat.    Respiratory:  Positive for cough and shortness of breath. Negative for chest tightness and wheezing.    Gastrointestinal: Negative.    Genitourinary: Negative.    Musculoskeletal:  Positive for arthralgias and leg pain.   Neurological: Negative.    Psychiatric/Behavioral: Negative.        Medical / Social / Family History     Past Medical History:   Diagnosis Date    Diabetes mellitus, type 2     Hyperlipidemia     Hypertension   "    Past Surgical History:   Procedure Laterality Date    HYSTERECTOMY       Social History  Ms. Estefania Urias  reports that she has never smoked. She has never used smokeless tobacco. She reports that she does not drink alcohol and does not use drugs.    Family History  Ms. Estefania Urias's family history includes Diabetes in her mother; Heart disease in her father.    Medications and Allergies     Medications  Outpatient Medications Marked as Taking for the 8/10/22 encounter (Office Visit) with KENZIE Mercado   Medication Sig Dispense Refill    aspirin (ECOTRIN) 81 MG EC tablet Take 81 mg by mouth once daily.      atorvastatin (LIPITOR) 40 MG tablet Take 1 tablet (40 mg total) by mouth once daily. 90 tablet 1    cetirizine (ZYRTEC) 10 MG tablet Take 1 tablet (10 mg total) by mouth nightly. 90 tablet 1    COMBIGAN 0.2-0.5 % Drop Place 1 drop into both eyes 2 (two) times daily.      EScitalopram oxalate (LEXAPRO) 20 MG tablet Take 1 tablet (20 mg total) by mouth once daily. 90 tablet 1    esomeprazole (NEXIUM) 40 MG capsule Take 1 capsule (40 mg total) by mouth once daily. 90 capsule 1    FARXIGA 10 mg tablet Take 1 tablet (10 mg total) by mouth once daily. 90 tablet 1    glipizide-metformin (METAGLIP) 5-500 mg per tablet Take 2 tablets by mouth 2 (two) times daily before meals. 360 tablet 1    hydrALAZINE (APRESOLINE) 25 MG tablet Take 1 tablet (25 mg total) by mouth 3 (three) times daily. 270 tablet 1    latanoprost 0.005 % ophthalmic solution Place 1 drop into both eyes nightly.      levothyroxine (SYNTHROID) 75 MCG tablet Take 1 tablet (75 mcg total) by mouth every morning. 90 tablet 1    ULTICARE PEN NEEDLE 32 gauge x 5/32" Ndle INJECT LEVEMIR AS DIRECTED 100 each 3    valsartan (DIOVAN) 160 MG tablet Take 1 tablet (160 mg total) by mouth once daily. 90 tablet 1    [DISCONTINUED] insulin detemir U-100 (LEVEMIR FLEXTOUCH U-100 INSULN) 100 unit/mL (3 mL) InPn pen Inject 20 Units into the skin every " evening. 6 mL 2     Allergies  Review of patient's allergies indicates:  No Known Allergies    Physical Examination     Vitals:    08/10/22 1120   BP: (!) 153/72   Pulse:    Temp:      Physical Exam  Vitals and nursing note reviewed.   Constitutional:       Appearance: Normal appearance. She is obese.   HENT:      Head: Normocephalic and atraumatic.      Right Ear: Tympanic membrane is erythematous.      Nose: Congestion present.      Right Turbinates: Swollen.      Left Turbinates: Swollen.      Mouth/Throat:      Pharynx: Posterior oropharyngeal erythema present.   Cardiovascular:      Rate and Rhythm: Normal rate and regular rhythm.      Heart sounds: Normal heart sounds.   Pulmonary:      Effort: Pulmonary effort is normal.      Breath sounds: Normal breath sounds.   Abdominal:      General: Bowel sounds are normal.   Musculoskeletal:      Cervical back: Normal range of motion.      Comments: Osteoarthritis affecting back, shoulders, and bilateral lower extremities.   Skin:     General: Skin is warm and dry.   Neurological:      Mental Status: She is alert and oriented to person, place, and time.   Psychiatric:         Behavior: Behavior normal.      Assessment and Plan (including Health Maintenance)      Problem List  Smart Sets  Document Outside HM   Plan:   Upper respiratory tract infection, unspecified type  -     azithromycin (Z-AIDE) 250 MG tablet; Take 2 tablets by mouth on day 1; Take 1 tablet by mouth on days 2-5  Dispense: 6 tablet; Refill: 0  -     albuterol (VENTOLIN HFA) 90 mcg/actuation inhaler; Inhale 2 puffs into the lungs every 6 (six) hours as needed for Wheezing. Rescue  Dispense: 18 g; Refill: 0  -     benzonatate (TESSALON) 100 MG capsule; Take 1 capsule (100 mg total) by mouth 3 (three) times daily as needed for Cough.  Dispense: 30 capsule; Refill: 0  -     montelukast (SINGULAIR) 10 mg tablet; Take 1 tablet (10 mg total) by mouth every evening.  Dispense: 30 tablet; Refill: 0    Type 2  diabetes mellitus without complication, with long-term current use of insulin  -     insulin detemir U-100 (LEVEMIR FLEXTOUCH U-100 INSULN) 100 unit/mL (3 mL) InPn pen; Inject 20 Units into the skin every evening.  Dispense: 6 mL; Refill: 2         Health Maintenance Due   Topic Date Due    Foot Exam  Never done    Mammogram  Never done    Colorectal Cancer Screening  Never done     Problem List Items Addressed This Visit          Endocrine    Type 2 diabetes mellitus without complication, with long-term current use of insulin    Relevant Medications    insulin detemir U-100 (LEVEMIR FLEXTOUCH U-100 INSULN) 100 unit/mL (3 mL) InPn pen     Other Visit Diagnoses       Upper respiratory tract infection, unspecified type    -  Primary    Relevant Medications    albuterol (VENTOLIN HFA) 90 mcg/actuation inhaler    montelukast (SINGULAIR) 10 mg tablet          Health Maintenance Topics with due status: Not Due       Topic Last Completion Date    Diabetes Urine Screening 06/08/2022    Lipid Panel 06/08/2022    Hemoglobin A1c 06/08/2022    Eye Exam 06/29/2022    Low Dose Statin 08/10/2022    Influenza Vaccine Not Due     Future Appointments   Date Time Provider Department Center   11/8/2022  1:30 PM KENZIE Mercado [facility]SE[facility]EL Isaac   6/28/2023  1:00 PM AWV NURSE, [facility] JOSEMANUEL Isaac      Patient Instructions   Increase fluid intake to stay hydrated. Take all doses of antibiotic therapy as prescribed. Do not stop taking when symptoms resolve, complete dosing. May take Tylenol or ibuprofen for fever or pain. Stay active, move around at least every 2 hours when awake. Go to the ED for any worsened condition or difficulty breathing. Return to clinic if condition persists.      Insurance allowed pharmacy to fill prescription for Levemir pens, patient to  when she leaves clinic.   Follow up in about 3 months (around 11/10/2022), or if symptoms worsen or fail to improve.      Signature:  KENZIE Mercado      Date of encounter: 8/10/22

## 2022-08-10 NOTE — PATIENT INSTRUCTIONS
Increase fluid intake to stay hydrated. Take all doses of antibiotic therapy as prescribed. Do not stop taking when symptoms resolve, complete dosing. May take Tylenol or ibuprofen for fever or pain. Stay active, move around at least every 2 hours when awake. Go to the ED for any worsened condition or difficulty breathing. Return to clinic if condition persists.      Insurance allowed pharmacy to fill prescription for Levemir pens, patient to  when she leaves clinic.

## 2022-11-09 ENCOUNTER — OFFICE VISIT (OUTPATIENT)
Dept: FAMILY MEDICINE | Facility: CLINIC | Age: 67
End: 2022-11-09
Payer: COMMERCIAL

## 2022-11-09 VITALS
RESPIRATION RATE: 18 BRPM | BODY MASS INDEX: 33.21 KG/M2 | OXYGEN SATURATION: 97 % | SYSTOLIC BLOOD PRESSURE: 157 MMHG | HEIGHT: 70 IN | DIASTOLIC BLOOD PRESSURE: 77 MMHG | HEART RATE: 62 BPM | WEIGHT: 232 LBS

## 2022-11-09 DIAGNOSIS — J40 BRONCHITIS: Primary | ICD-10-CM

## 2022-11-09 DIAGNOSIS — Z71.89 COMPLEX CARE COORDINATION: ICD-10-CM

## 2022-11-09 DIAGNOSIS — Z12.31 ENCOUNTER FOR SCREENING MAMMOGRAM FOR MALIGNANT NEOPLASM OF BREAST: ICD-10-CM

## 2022-11-09 DIAGNOSIS — K21.9 GASTROESOPHAGEAL REFLUX DISEASE, UNSPECIFIED WHETHER ESOPHAGITIS PRESENT: ICD-10-CM

## 2022-11-09 DIAGNOSIS — R06.2 WHEEZING: ICD-10-CM

## 2022-11-09 DIAGNOSIS — E03.9 HYPOTHYROIDISM, UNSPECIFIED TYPE: ICD-10-CM

## 2022-11-09 DIAGNOSIS — I10 ESSENTIAL HYPERTENSION: ICD-10-CM

## 2022-11-09 DIAGNOSIS — E11.9 TYPE 2 DIABETES MELLITUS WITHOUT COMPLICATION, WITH LONG-TERM CURRENT USE OF INSULIN: ICD-10-CM

## 2022-11-09 DIAGNOSIS — Z79.4 TYPE 2 DIABETES MELLITUS WITHOUT COMPLICATION, WITH LONG-TERM CURRENT USE OF INSULIN: ICD-10-CM

## 2022-11-09 LAB
ANION GAP SERPL CALCULATED.3IONS-SCNC: 8 MMOL/L (ref 7–16)
BASOPHILS # BLD AUTO: 0.04 K/UL (ref 0–0.2)
BASOPHILS NFR BLD AUTO: 0.6 % (ref 0–1)
BUN SERPL-MCNC: 11 MG/DL (ref 7–18)
BUN/CREAT SERPL: 13 (ref 6–20)
CALCIUM SERPL-MCNC: 9.2 MG/DL (ref 8.5–10.1)
CHLORIDE SERPL-SCNC: 108 MMOL/L (ref 98–107)
CO2 SERPL-SCNC: 29 MMOL/L (ref 21–32)
CREAT SERPL-MCNC: 0.88 MG/DL (ref 0.55–1.02)
DIFFERENTIAL METHOD BLD: ABNORMAL
EGFR (NO RACE VARIABLE) (RUSH/TITUS): 73 ML/MIN/1.73M²
EOSINOPHIL # BLD AUTO: 0.18 K/UL (ref 0–0.5)
EOSINOPHIL NFR BLD AUTO: 2.9 % (ref 1–4)
ERYTHROCYTE [DISTWIDTH] IN BLOOD BY AUTOMATED COUNT: 14.8 % (ref 11.5–14.5)
GLUCOSE SERPL-MCNC: 74 MG/DL (ref 74–106)
HCT VFR BLD AUTO: 42.2 % (ref 38–47)
HGB BLD-MCNC: 13.3 G/DL (ref 12–16)
IMM GRANULOCYTES # BLD AUTO: 0.01 K/UL (ref 0–0.04)
IMM GRANULOCYTES NFR BLD: 0.2 % (ref 0–0.4)
LYMPHOCYTES # BLD AUTO: 3.74 K/UL (ref 1–4.8)
LYMPHOCYTES NFR BLD AUTO: 59.9 % (ref 27–41)
MCH RBC QN AUTO: 27.7 PG (ref 27–31)
MCHC RBC AUTO-ENTMCNC: 31.5 G/DL (ref 32–36)
MCV RBC AUTO: 87.7 FL (ref 80–96)
MONOCYTES # BLD AUTO: 0.46 K/UL (ref 0–0.8)
MONOCYTES NFR BLD AUTO: 7.4 % (ref 2–6)
MPC BLD CALC-MCNC: 11.8 FL (ref 9.4–12.4)
NEUTROPHILS # BLD AUTO: 1.81 K/UL (ref 1.8–7.7)
NEUTROPHILS NFR BLD AUTO: 29 % (ref 53–65)
NRBC # BLD AUTO: 0 X10E3/UL
NRBC, AUTO (.00): 0 %
NT-PROBNP SERPL-MCNC: 126 PG/ML (ref 1–125)
PLATELET # BLD AUTO: 239 K/UL (ref 150–400)
POTASSIUM SERPL-SCNC: 4.1 MMOL/L (ref 3.5–5.1)
RBC # BLD AUTO: 4.81 M/UL (ref 4.2–5.4)
SODIUM SERPL-SCNC: 141 MMOL/L (ref 136–145)
WBC # BLD AUTO: 6.24 K/UL (ref 4.5–11)

## 2022-11-09 PROCEDURE — 3051F PR MOST RECENT HEMOGLOBIN A1C LEVEL 7.0 - < 8.0%: ICD-10-PCS | Mod: ,,, | Performed by: REGISTERED NURSE

## 2022-11-09 PROCEDURE — 94640 AIRWAY INHALATION TREATMENT: CPT | Mod: ,,, | Performed by: REGISTERED NURSE

## 2022-11-09 PROCEDURE — 1160F RVW MEDS BY RX/DR IN RCRD: CPT | Mod: ,,, | Performed by: REGISTERED NURSE

## 2022-11-09 PROCEDURE — 80048 BASIC METABOLIC PNL TOTAL CA: CPT | Mod: ,,, | Performed by: CLINICAL MEDICAL LABORATORY

## 2022-11-09 PROCEDURE — 1160F PR REVIEW ALL MEDS BY PRESCRIBER/CLIN PHARMACIST DOCUMENTED: ICD-10-PCS | Mod: ,,, | Performed by: REGISTERED NURSE

## 2022-11-09 PROCEDURE — 83880 ASSAY OF NATRIURETIC PEPTIDE: CPT | Mod: ,,, | Performed by: CLINICAL MEDICAL LABORATORY

## 2022-11-09 PROCEDURE — 83880 NT-PRO NATRIURETIC PEPTIDE: ICD-10-PCS | Mod: ,,, | Performed by: CLINICAL MEDICAL LABORATORY

## 2022-11-09 PROCEDURE — 94640 PR INHAL RX, AIRWAY OBST/DX SPUTUM INDUCT: ICD-10-PCS | Mod: ,,, | Performed by: REGISTERED NURSE

## 2022-11-09 PROCEDURE — 4010F ACE/ARB THERAPY RXD/TAKEN: CPT | Mod: ,,, | Performed by: REGISTERED NURSE

## 2022-11-09 PROCEDURE — 99213 PR OFFICE/OUTPT VISIT, EST, LEVL III, 20-29 MIN: ICD-10-PCS | Mod: 25,,, | Performed by: REGISTERED NURSE

## 2022-11-09 PROCEDURE — 3008F BODY MASS INDEX DOCD: CPT | Mod: ,,, | Performed by: REGISTERED NURSE

## 2022-11-09 PROCEDURE — 3078F PR MOST RECENT DIASTOLIC BLOOD PRESSURE < 80 MM HG: ICD-10-PCS | Mod: ,,, | Performed by: REGISTERED NURSE

## 2022-11-09 PROCEDURE — 3077F SYST BP >= 140 MM HG: CPT | Mod: ,,, | Performed by: REGISTERED NURSE

## 2022-11-09 PROCEDURE — 85025 COMPLETE CBC W/AUTO DIFF WBC: CPT | Mod: ,,, | Performed by: CLINICAL MEDICAL LABORATORY

## 2022-11-09 PROCEDURE — 99213 OFFICE O/P EST LOW 20 MIN: CPT | Mod: 25,,, | Performed by: REGISTERED NURSE

## 2022-11-09 PROCEDURE — 1159F MED LIST DOCD IN RCRD: CPT | Mod: ,,, | Performed by: REGISTERED NURSE

## 2022-11-09 PROCEDURE — 96372 PR INJECTION,THERAP/PROPH/DIAG2ST, IM OR SUBCUT: ICD-10-PCS | Mod: 59,,, | Performed by: REGISTERED NURSE

## 2022-11-09 PROCEDURE — 3077F PR MOST RECENT SYSTOLIC BLOOD PRESSURE >= 140 MM HG: ICD-10-PCS | Mod: ,,, | Performed by: REGISTERED NURSE

## 2022-11-09 PROCEDURE — 3008F PR BODY MASS INDEX (BMI) DOCUMENTED: ICD-10-PCS | Mod: ,,, | Performed by: REGISTERED NURSE

## 2022-11-09 PROCEDURE — 4010F PR ACE/ARB THEARPY RXD/TAKEN: ICD-10-PCS | Mod: ,,, | Performed by: REGISTERED NURSE

## 2022-11-09 PROCEDURE — 1159F PR MEDICATION LIST DOCUMENTED IN MEDICAL RECORD: ICD-10-PCS | Mod: ,,, | Performed by: REGISTERED NURSE

## 2022-11-09 PROCEDURE — 96372 THER/PROPH/DIAG INJ SC/IM: CPT | Mod: 59,,, | Performed by: REGISTERED NURSE

## 2022-11-09 PROCEDURE — 3051F HG A1C>EQUAL 7.0%<8.0%: CPT | Mod: ,,, | Performed by: REGISTERED NURSE

## 2022-11-09 PROCEDURE — 80048 BASIC METABOLIC PANEL: ICD-10-PCS | Mod: ,,, | Performed by: CLINICAL MEDICAL LABORATORY

## 2022-11-09 PROCEDURE — 3078F DIAST BP <80 MM HG: CPT | Mod: ,,, | Performed by: REGISTERED NURSE

## 2022-11-09 PROCEDURE — 85025 CBC WITH DIFFERENTIAL: ICD-10-PCS | Mod: ,,, | Performed by: CLINICAL MEDICAL LABORATORY

## 2022-11-09 RX ORDER — PROMETHAZINE HYDROCHLORIDE AND DEXTROMETHORPHAN HYDROBROMIDE 6.25; 15 MG/5ML; MG/5ML
5 SYRUP ORAL EVERY 8 HOURS PRN
Qty: 118 ML | Refills: 0 | Status: SHIPPED | OUTPATIENT
Start: 2022-11-09 | End: 2022-11-19

## 2022-11-09 RX ORDER — HYDRALAZINE HYDROCHLORIDE 25 MG/1
25 TABLET, FILM COATED ORAL 3 TIMES DAILY
Qty: 270 TABLET | Refills: 1 | Status: SHIPPED | OUTPATIENT
Start: 2022-11-09 | End: 2023-05-01 | Stop reason: SDUPTHER

## 2022-11-09 RX ORDER — DAPAGLIFLOZIN 10 MG/1
10 TABLET, FILM COATED ORAL DAILY
Qty: 90 TABLET | Refills: 1 | Status: SHIPPED | OUTPATIENT
Start: 2022-11-09 | End: 2023-05-01 | Stop reason: SDUPTHER

## 2022-11-09 RX ORDER — LEVOTHYROXINE SODIUM 75 UG/1
75 TABLET ORAL EVERY MORNING
Qty: 90 TABLET | Refills: 1 | Status: SHIPPED | OUTPATIENT
Start: 2022-11-09 | End: 2023-05-01 | Stop reason: SDUPTHER

## 2022-11-09 RX ORDER — AZITHROMYCIN 250 MG/1
TABLET, FILM COATED ORAL
Qty: 6 TABLET | Refills: 0 | Status: SHIPPED | OUTPATIENT
Start: 2022-11-09 | End: 2022-11-14

## 2022-11-09 RX ORDER — CEFTRIAXONE 1 G/1
1 INJECTION, POWDER, FOR SOLUTION INTRAMUSCULAR; INTRAVENOUS
Status: COMPLETED | OUTPATIENT
Start: 2022-11-09 | End: 2022-11-09

## 2022-11-09 RX ORDER — GLIPIZIDE AND METFORMIN HCL 5; 500 MG/1; MG/1
2 TABLET, FILM COATED ORAL
Qty: 360 TABLET | Refills: 1 | Status: SHIPPED | OUTPATIENT
Start: 2022-11-09 | End: 2023-02-14 | Stop reason: SDUPTHER

## 2022-11-09 RX ORDER — ALBUTEROL SULFATE 0.83 MG/ML
2.5 SOLUTION RESPIRATORY (INHALATION) EVERY 6 HOURS PRN
Qty: 75 ML | Refills: 0 | Status: SHIPPED | OUTPATIENT
Start: 2022-11-09 | End: 2023-02-07

## 2022-11-09 RX ORDER — ESOMEPRAZOLE MAGNESIUM 40 MG/1
40 CAPSULE, DELAYED RELEASE ORAL DAILY
Qty: 90 CAPSULE | Refills: 1 | Status: SHIPPED | OUTPATIENT
Start: 2022-11-09 | End: 2023-05-01 | Stop reason: SDUPTHER

## 2022-11-09 RX ORDER — ALBUTEROL SULFATE 0.83 MG/ML
2.5 SOLUTION RESPIRATORY (INHALATION)
Status: COMPLETED | OUTPATIENT
Start: 2022-11-09 | End: 2022-11-09

## 2022-11-09 RX ORDER — VALSARTAN 160 MG/1
160 TABLET ORAL DAILY
Qty: 90 TABLET | Refills: 1 | Status: SHIPPED | OUTPATIENT
Start: 2022-11-09 | End: 2023-02-14 | Stop reason: SDUPTHER

## 2022-11-09 RX ADMIN — CEFTRIAXONE 1 G: 1 INJECTION, POWDER, FOR SOLUTION INTRAMUSCULAR; INTRAVENOUS at 04:11

## 2022-11-09 RX ADMIN — ALBUTEROL SULFATE 2.5 MG: 0.83 SOLUTION RESPIRATORY (INHALATION) at 03:11

## 2022-11-09 NOTE — PROGRESS NOTES
"   KENZIE Mercado        PATIENT NAME: Estefania Urias  : 1955  DATE: 22  MRN: [id number]      Billing Provider: KENZIE Mercado  Level of Service: [place] OFFICE/OUTPT VISIT, EST, LEVL III, 20-29 MIN  Patient PCP Information       Provider PCP Type    KENZIE Mercado General            Reason for Visit / Chief Complaint: Cough (C/o of a cough for about at week)       Update PCP  Update Chief Complaint         History of Present Illness / Problem Focused Workflow     HPI Ms. Urias is a 67 y/o AAF here with complaints of cough, chest congestion, and fatigue. Symptoms started 3-4 days ago with upper respiratory congestion and runny nose. She denies known exposure to infectious illness. No fever, no nausea, vomiting, or diarrhea. She has history of allergic rhinitis and recurrent upper respiratory infections. She is short of breath with exertion, states he chest feels tight and she cannot "get a good deep breath."     Review of Systems     Review of Systems   Constitutional:  Positive for activity change, appetite change, chills and fatigue.   HENT:  Positive for nasal congestion, ear pain, postnasal drip, sinus pressure/congestion, sneezing, sore throat and voice change.    Respiratory:  Positive for cough, chest tightness, shortness of breath and wheezing.    Cardiovascular:  Negative for chest pain and leg swelling.   Gastrointestinal:  Positive for reflux.   Musculoskeletal:  Positive for arthralgias, gait problem and myalgias.   Neurological:  Positive for weakness.      Medical / Social / Family History     Past Medical History:   Diagnosis Date    Diabetes mellitus, type 2     Hyperlipidemia     Hypertension        Past Surgical History:   Procedure Laterality Date    HYSTERECTOMY       Social History  Ms. Estefania Urias  reports that she has never smoked. She has never used smokeless tobacco. She reports that she does not drink alcohol and does not use drugs.    Family " "History  Ms. Estefania Urias's family history includes Diabetes in her mother; Heart disease in her father.    Medications and Allergies     Medications  Outpatient Medications Marked as Taking for the 11/9/22 encounter (Office Visit) with KENZIE Mercado   Medication Sig Dispense Refill    albuterol (VENTOLIN HFA) 90 mcg/actuation inhaler Inhale 2 puffs into the lungs every 6 (six) hours as needed for Wheezing. Rescue 18 g 0    aspirin (ECOTRIN) 81 MG EC tablet Take 81 mg by mouth once daily.      atorvastatin (LIPITOR) 40 MG tablet Take 1 tablet (40 mg total) by mouth once daily. 90 tablet 1    cetirizine (ZYRTEC) 10 MG tablet Take 1 tablet (10 mg total) by mouth nightly. 90 tablet 1    COMBIGAN 0.2-0.5 % Drop Place 1 drop into both eyes 2 (two) times daily.      EScitalopram oxalate (LEXAPRO) 20 MG tablet Take 1 tablet (20 mg total) by mouth once daily. 90 tablet 1    latanoprost 0.005 % ophthalmic solution Place 1 drop into both eyes nightly.      ULTICARE PEN NEEDLE 32 gauge x 5/32" Ndle INJECT LEVEMIR AS DIRECTED 100 each 3    [DISCONTINUED] esomeprazole (NEXIUM) 40 MG capsule Take 1 capsule (40 mg total) by mouth once daily. 90 capsule 1    [DISCONTINUED] FARXIGA 10 mg tablet Take 1 tablet (10 mg total) by mouth once daily. 90 tablet 1    [DISCONTINUED] glipizide-metformin (METAGLIP) 5-500 mg per tablet Take 2 tablets by mouth 2 (two) times daily before meals. 360 tablet 1    [DISCONTINUED] hydrALAZINE (APRESOLINE) 25 MG tablet Take 1 tablet (25 mg total) by mouth 3 (three) times daily. 270 tablet 1    [DISCONTINUED] levothyroxine (SYNTHROID) 75 MCG tablet Take 1 tablet (75 mcg total) by mouth every morning. 90 tablet 1    [DISCONTINUED] valsartan (DIOVAN) 160 MG tablet Take 1 tablet (160 mg total) by mouth once daily. 90 tablet 1     Allergies  Review of patient's allergies indicates:  No Known Allergies    Physical Examination     Vitals:    11/09/22 1437   BP: (!) 157/77   Pulse: 62   Resp: 18 "     Physical Exam  Vitals and nursing note reviewed.   Constitutional:       Appearance: Normal appearance. She is obese. She is ill-appearing.   HENT:      Head: Normocephalic and atraumatic.   Cardiovascular:      Rate and Rhythm: Normal rate and regular rhythm.      Heart sounds: Normal heart sounds.   Pulmonary:      Effort: Tachypnea present.      Breath sounds: Decreased air movement present. Examination of the right-upper field reveals wheezing. Examination of the left-upper field reveals wheezing. Examination of the left-lower field reveals decreased breath sounds. Decreased breath sounds and wheezing present.   Musculoskeletal:      Cervical back: Normal range of motion.      Lumbar back: Decreased range of motion.   Skin:     General: Skin is warm and dry.   Neurological:      Mental Status: She is alert and oriented to person, place, and time.      Assessment and Plan (including Health Maintenance)      Problem List  Smart Sets  Document Outside HM   Plan:   Bronchitis  -     albuterol (PROVENTIL) 2.5 mg /3 mL (0.083 %) nebulizer solution; Take 3 mLs (2.5 mg total) by nebulization every 6 (six) hours as needed for Wheezing or Shortness of Breath. Rescue  Dispense: 75 mL; Refill: 0  -     cefTRIAXone injection 1 g  -     promethazine-dextromethorphan (PROMETHAZINE-DM) 6.25-15 mg/5 mL Syrp; Take 5 mLs by mouth every 8 (eight) hours as needed (cough or congestion).  Dispense: 118 mL; Refill: 0  -     azithromycin (Z-AIDE) 250 MG tablet; Take 2 tablets by mouth on day 1; Take 1 tablet by mouth on days 2-5  Dispense: 6 tablet; Refill: 0    Hypothyroidism, unspecified type  -     levothyroxine (SYNTHROID) 75 MCG tablet; Take 1 tablet (75 mcg total) by mouth every morning.  Dispense: 90 tablet; Refill: 1    Essential hypertension  -     valsartan (DIOVAN) 160 MG tablet; Take 1 tablet (160 mg total) by mouth once daily.  Dispense: 90 tablet; Refill: 1  -     hydrALAZINE (APRESOLINE) 25 MG tablet; Take 1 tablet (25  mg total) by mouth 3 (three) times daily.  Dispense: 270 tablet; Refill: 1    Type 2 diabetes mellitus without complication, with long-term current use of insulin  -     glipizide-metformin (METAGLIP) 5-500 mg per tablet; Take 2 tablets by mouth 2 (two) times daily before meals.  Dispense: 360 tablet; Refill: 1  -     FARXIGA 10 mg tablet; Take 1 tablet (10 mg total) by mouth once daily.  Dispense: 90 tablet; Refill: 1    Gastroesophageal reflux disease, unspecified whether esophagitis present  -     esomeprazole (NEXIUM) 40 MG capsule; Take 1 capsule (40 mg total) by mouth once daily.  Dispense: 90 capsule; Refill: 1    Wheezing  -     X-Ray Chest PA And Lateral; Future; Expected date: 11/09/2022  -     albuterol nebulizer solution 2.5 mg  -     CBC Auto Differential; Future; Expected date: 11/09/2022  -     Basic Metabolic Panel; Future; Expected date: 11/09/2022  -     NT-Pro Natriuretic Peptide; Future; Expected date: 11/09/2022  -     albuterol (PROVENTIL) 2.5 mg /3 mL (0.083 %) nebulizer solution; Take 3 mLs (2.5 mg total) by nebulization every 6 (six) hours as needed for Wheezing or Shortness of Breath. Rescue  Dispense: 75 mL; Refill: 0    Encounter for screening mammogram for malignant neoplasm of breast  -     Mammo Digital Screening Bilat; Future; Expected date: 11/09/2022     Albuterol nebulized treatment administered in clinic. Wheezing improved and patient states her chest feels less tight. Will treat for bronchitis with inhaled breathing treatments. Ordering nebulizer from [employer] per patient is clumsy with inhaler and states she does not feel she is getting full dose of medication.     Health Maintenance Due   Topic Date Due    Foot Exam  Never done    Mammogram  Never done    Colorectal Cancer Screening  Never done    COVID-19 Vaccine (5 - Booster for Pfizer series) 08/05/2022       Problem List Items Addressed This Visit          Cardiac/Vascular    Essential hypertension    Relevant  Medications    valsartan (DIOVAN) 160 MG tablet    hydrALAZINE (APRESOLINE) 25 MG tablet       Endocrine    Type 2 diabetes mellitus without complication, with long-term current use of insulin    Relevant Medications    glipizide-metformin (METAGLIP) 5-500 mg per tablet    FARXIGA 10 mg tablet     Other Visit Diagnoses       Bronchitis    -  Primary    Relevant Medications    albuterol (PROVENTIL) 2.5 mg /3 mL (0.083 %) nebulizer solution    cefTRIAXone injection 1 g (Completed)    Hypothyroidism, unspecified type        Relevant Medications    levothyroxine (SYNTHROID) 75 MCG tablet    Gastroesophageal reflux disease, unspecified whether esophagitis present        Relevant Medications    esomeprazole (NEXIUM) 40 MG capsule    Wheezing        Relevant Medications    albuterol nebulizer solution 2.5 mg (Completed)    albuterol (PROVENTIL) 2.5 mg /3 mL (0.083 %) nebulizer solution    Other Relevant Orders    X-Ray Chest PA And Lateral (Completed)    CBC Auto Differential (Completed)    Basic Metabolic Panel (Completed)    NT-Pro Natriuretic Peptide (Completed)    Encounter for screening mammogram for malignant neoplasm of breast        Relevant Orders    Mammo Digital Screening Bilat            Health Maintenance Topics with due status: Not Due       Topic Last Completion Date    Diabetes Urine Screening 06/08/2022    Lipid Panel 06/08/2022    Hemoglobin A1c 06/08/2022    Eye Exam 06/29/2022    Low Dose Statin 11/09/2022     Future Appointments   Date Time Provider Department Center   2/10/2023  1:00 PM KENZIE Mercado [facility] JOSEMANUEL Iasac   6/28/2023  1:00 PM AWV NURSE, [facility] PRIMARY CARE [facility] JOSEMANUEL Isaac      Patient Instructions   Increase fluid intake to stay hydrated. Take all doses of antibiotic therapy as prescribed. Do not stop taking when symptoms resolve, complete dosing. May take Tylenol or ibuprofen for fever or pain. Stay active, move around at least every 2 hours when awake. Go to  the ED for any worsened condition or difficulty breathing. Return to clinic if condition persists.    Follow up in about 3 months (around 2/9/2023), or if symptoms worsen or fail to improve.     Signature:  KENZIE Mercado      Date of encounter: 11/9/22

## 2022-11-22 ENCOUNTER — TELEPHONE (OUTPATIENT)
Dept: FAMILY MEDICINE | Facility: CLINIC | Age: 67
End: 2022-11-22
Payer: MEDICAID

## 2022-11-22 NOTE — TELEPHONE ENCOUNTER
----- Message from KENZIE Mercado sent at 11/21/2022  1:38 PM CST -----  Please follow-up with Mrs. Urias and let her know her lab work looks okay. No changes need to be made at this time.

## 2023-01-03 DIAGNOSIS — E11.9 TYPE 2 DIABETES MELLITUS WITHOUT COMPLICATION, WITH LONG-TERM CURRENT USE OF INSULIN: ICD-10-CM

## 2023-01-03 DIAGNOSIS — Z79.4 TYPE 2 DIABETES MELLITUS WITHOUT COMPLICATION, WITH LONG-TERM CURRENT USE OF INSULIN: ICD-10-CM

## 2023-01-03 RX ORDER — INSULIN DETEMIR 100 [IU]/ML
20 INJECTION, SOLUTION SUBCUTANEOUS NIGHTLY
Qty: 6 ML | Refills: 2 | Status: SHIPPED | OUTPATIENT
Start: 2023-01-03 | End: 2023-02-14 | Stop reason: SDUPTHER

## 2023-02-14 ENCOUNTER — OFFICE VISIT (OUTPATIENT)
Dept: FAMILY MEDICINE | Facility: CLINIC | Age: 68
End: 2023-02-14
Payer: COMMERCIAL

## 2023-02-14 VITALS
HEART RATE: 61 BPM | RESPIRATION RATE: 18 BRPM | WEIGHT: 227 LBS | DIASTOLIC BLOOD PRESSURE: 74 MMHG | TEMPERATURE: 98 F | SYSTOLIC BLOOD PRESSURE: 155 MMHG | BODY MASS INDEX: 32.57 KG/M2 | OXYGEN SATURATION: 96 %

## 2023-02-14 DIAGNOSIS — B35.1 ONYCHOMYCOSIS: ICD-10-CM

## 2023-02-14 DIAGNOSIS — E11.9 TYPE 2 DIABETES MELLITUS WITHOUT COMPLICATION, WITH LONG-TERM CURRENT USE OF INSULIN: Primary | ICD-10-CM

## 2023-02-14 DIAGNOSIS — R05.3 PERSISTENT COUGH: ICD-10-CM

## 2023-02-14 DIAGNOSIS — I10 ESSENTIAL HYPERTENSION: ICD-10-CM

## 2023-02-14 DIAGNOSIS — Z79.4 TYPE 2 DIABETES MELLITUS WITHOUT COMPLICATION, WITH LONG-TERM CURRENT USE OF INSULIN: Primary | ICD-10-CM

## 2023-02-14 PROCEDURE — 3008F PR BODY MASS INDEX (BMI) DOCUMENTED: ICD-10-PCS | Mod: ,,, | Performed by: REGISTERED NURSE

## 2023-02-14 PROCEDURE — 4010F ACE/ARB THERAPY RXD/TAKEN: CPT | Mod: ,,, | Performed by: REGISTERED NURSE

## 2023-02-14 PROCEDURE — 83880 ASSAY OF NATRIURETIC PEPTIDE: CPT | Mod: ,,, | Performed by: CLINICAL MEDICAL LABORATORY

## 2023-02-14 PROCEDURE — 1160F PR REVIEW ALL MEDS BY PRESCRIBER/CLIN PHARMACIST DOCUMENTED: ICD-10-PCS | Mod: ,,, | Performed by: REGISTERED NURSE

## 2023-02-14 PROCEDURE — 1159F MED LIST DOCD IN RCRD: CPT | Mod: ,,, | Performed by: REGISTERED NURSE

## 2023-02-14 PROCEDURE — 80048 BASIC METABOLIC PNL TOTAL CA: CPT | Mod: ,,, | Performed by: CLINICAL MEDICAL LABORATORY

## 2023-02-14 PROCEDURE — 3051F HG A1C>EQUAL 7.0%<8.0%: CPT | Mod: ,,, | Performed by: REGISTERED NURSE

## 2023-02-14 PROCEDURE — 99214 PR OFFICE/OUTPT VISIT, EST, LEVL IV, 30-39 MIN: ICD-10-PCS | Mod: ,,, | Performed by: REGISTERED NURSE

## 2023-02-14 PROCEDURE — 1159F PR MEDICATION LIST DOCUMENTED IN MEDICAL RECORD: ICD-10-PCS | Mod: ,,, | Performed by: REGISTERED NURSE

## 2023-02-14 PROCEDURE — 99214 OFFICE O/P EST MOD 30 MIN: CPT | Mod: ,,, | Performed by: REGISTERED NURSE

## 2023-02-14 PROCEDURE — 3008F BODY MASS INDEX DOCD: CPT | Mod: ,,, | Performed by: REGISTERED NURSE

## 2023-02-14 PROCEDURE — 83880 NT-PRO NATRIURETIC PEPTIDE: ICD-10-PCS | Mod: ,,, | Performed by: CLINICAL MEDICAL LABORATORY

## 2023-02-14 PROCEDURE — 3077F SYST BP >= 140 MM HG: CPT | Mod: ,,, | Performed by: REGISTERED NURSE

## 2023-02-14 PROCEDURE — 3051F PR MOST RECENT HEMOGLOBIN A1C LEVEL 7.0 - < 8.0%: ICD-10-PCS | Mod: ,,, | Performed by: REGISTERED NURSE

## 2023-02-14 PROCEDURE — 3077F PR MOST RECENT SYSTOLIC BLOOD PRESSURE >= 140 MM HG: ICD-10-PCS | Mod: ,,, | Performed by: REGISTERED NURSE

## 2023-02-14 PROCEDURE — 3078F PR MOST RECENT DIASTOLIC BLOOD PRESSURE < 80 MM HG: ICD-10-PCS | Mod: ,,, | Performed by: REGISTERED NURSE

## 2023-02-14 PROCEDURE — 83036 HEMOGLOBIN A1C: ICD-10-PCS | Mod: ,,, | Performed by: CLINICAL MEDICAL LABORATORY

## 2023-02-14 PROCEDURE — 83036 HEMOGLOBIN GLYCOSYLATED A1C: CPT | Mod: ,,, | Performed by: CLINICAL MEDICAL LABORATORY

## 2023-02-14 PROCEDURE — 3078F DIAST BP <80 MM HG: CPT | Mod: ,,, | Performed by: REGISTERED NURSE

## 2023-02-14 PROCEDURE — 80048 BASIC METABOLIC PANEL: ICD-10-PCS | Mod: ,,, | Performed by: CLINICAL MEDICAL LABORATORY

## 2023-02-14 PROCEDURE — 4010F PR ACE/ARB THEARPY RXD/TAKEN: ICD-10-PCS | Mod: ,,, | Performed by: REGISTERED NURSE

## 2023-02-14 PROCEDURE — 1160F RVW MEDS BY RX/DR IN RCRD: CPT | Mod: ,,, | Performed by: REGISTERED NURSE

## 2023-02-14 RX ORDER — VALSARTAN 160 MG/1
160 TABLET ORAL DAILY
Qty: 90 TABLET | Refills: 1 | Status: SHIPPED | OUTPATIENT
Start: 2023-02-14 | End: 2023-10-23 | Stop reason: SDUPTHER

## 2023-02-14 RX ORDER — BENZONATATE 100 MG/1
100 CAPSULE ORAL 3 TIMES DAILY PRN
Qty: 30 CAPSULE | Refills: 0 | Status: SHIPPED | OUTPATIENT
Start: 2023-02-14 | End: 2023-02-24

## 2023-02-14 RX ORDER — GLIPIZIDE AND METFORMIN HCL 5; 500 MG/1; MG/1
2 TABLET, FILM COATED ORAL
Qty: 360 TABLET | Refills: 1 | Status: SHIPPED | OUTPATIENT
Start: 2023-02-14 | End: 2023-08-01 | Stop reason: SDUPTHER

## 2023-02-14 RX ORDER — INSULIN DETEMIR 100 [IU]/ML
20 INJECTION, SOLUTION SUBCUTANEOUS NIGHTLY
Qty: 18 ML | Refills: 1 | Status: SHIPPED | OUTPATIENT
Start: 2023-02-14 | End: 2023-10-23 | Stop reason: SDUPTHER

## 2023-02-14 RX ORDER — ALBUTEROL SULFATE 90 UG/1
2 AEROSOL, METERED RESPIRATORY (INHALATION) EVERY 6 HOURS PRN
Qty: 18 G | Refills: 0 | Status: SHIPPED | OUTPATIENT
Start: 2023-02-14 | End: 2024-02-14

## 2023-02-14 NOTE — PROGRESS NOTES
KENZIE Mercado        PATIENT NAME: Estefania Urias  : 1955  DATE: 23  MRN: [id number]      Billing Provider: KENZIE Mercado  Level of Service: [place] OFFICE/OUTPT VISIT, EST, LEVL IV, 30-39 MIN  Patient PCP Information       Provider PCP Type    KENZIE Mercado General            Reason for Visit / Chief Complaint: Follow-up and Medication Refill       Update PCP  Update Chief Complaint         History of Present Illness / Problem Focused Workflow      HPI Mrs. Urias is a 67-year-old AAF here for Diabetes and Hypertension follow-up. She is due for lab work and requests medication refills. Foot assessment performed, attempted to trim toenails. Nails are brittle and discolored. Referring to Podiatry for assistance in maintaining nail care and skin integrity.     Review of Systems     Review of Systems   Constitutional: Negative.    Respiratory:  Positive for cough and shortness of breath. Negative for choking, chest tightness and wheezing.    Cardiovascular:  Negative for chest pain and leg swelling.   Gastrointestinal: Negative.    Genitourinary: Negative.    Musculoskeletal:  Positive for arthralgias, back pain, leg pain and myalgias.   Neurological:  Positive for weakness.      Medical / Social / Family History     Past Medical History:   Diagnosis Date    Diabetes mellitus, type 2     Hyperlipidemia     Hypertension        Past Surgical History:   Procedure Laterality Date    HYSTERECTOMY         Social History  Ms. Estefania Urias  reports that she has never smoked. She has never used smokeless tobacco. She reports that she does not drink alcohol and does not use drugs.    Family History  Ms. Estefania Urias's family history includes Diabetes in her mother; Heart disease in her father.    Medications and Allergies     Medications  Outpatient Medications Marked as Taking for the 23 encounter (Office Visit) with KENZIE Mercado   Medication Sig Dispense Refill     "aspirin (ECOTRIN) 81 MG EC tablet Take 81 mg by mouth once daily.      atorvastatin (LIPITOR) 40 MG tablet Take 1 tablet (40 mg total) by mouth once daily. 90 tablet 1    cetirizine (ZYRTEC) 10 MG tablet Take 1 tablet (10 mg total) by mouth nightly. 90 tablet 1    COMBIGAN 0.2-0.5 % Drop Place 1 drop into both eyes 2 (two) times daily.      EScitalopram oxalate (LEXAPRO) 20 MG tablet Take 1 tablet (20 mg total) by mouth once daily. 90 tablet 1    esomeprazole (NEXIUM) 40 MG capsule Take 1 capsule (40 mg total) by mouth once daily. 90 capsule 1    FARXIGA 10 mg tablet Take 1 tablet (10 mg total) by mouth once daily. 90 tablet 1    hydrALAZINE (APRESOLINE) 25 MG tablet Take 1 tablet (25 mg total) by mouth 3 (three) times daily. 270 tablet 1    latanoprost 0.005 % ophthalmic solution Place 1 drop into both eyes nightly.      levothyroxine (SYNTHROID) 75 MCG tablet Take 1 tablet (75 mcg total) by mouth every morning. 90 tablet 1    metoprolol succinate (TOPROL-XL) 25 MG 24 hr tablet Take 1 tablet (25 mg total) by mouth once daily. 90 tablet 1    ULTICARE PEN NEEDLE 32 gauge x 5/32" Ndle INJECT LEVEMIR AS DIRECTED 100 each 3    [DISCONTINUED] albuterol (VENTOLIN HFA) 90 mcg/actuation inhaler Inhale 2 puffs into the lungs every 6 (six) hours as needed for Wheezing. Rescue 18 g 0    [DISCONTINUED] glipizide-metformin (METAGLIP) 5-500 mg per tablet Take 2 tablets by mouth 2 (two) times daily before meals. 360 tablet 1    [DISCONTINUED] insulin detemir U-100 (LEVEMIR FLEXTOUCH U-100 INSULN) 100 unit/mL (3 mL) InPn pen Inject 20 Units into the skin every evening. 6 mL 2    [DISCONTINUED] valsartan (DIOVAN) 160 MG tablet Take 1 tablet (160 mg total) by mouth once daily. 90 tablet 1     Allergies  Review of patient's allergies indicates:  No Known Allergies    Physical Examination     Vitals:    02/14/23 1526   BP: (!) 155/74   Pulse:    Resp:    Temp:      Physical Exam  Vitals and nursing note reviewed.   Constitutional:      "  Appearance: Normal appearance. She is obese.   HENT:      Head: Normocephalic and atraumatic.   Cardiovascular:      Rate and Rhythm: Normal rate and regular rhythm.      Heart sounds: Normal heart sounds.   Pulmonary:      Effort: Pulmonary effort is normal.      Breath sounds: Normal breath sounds.   Abdominal:      General: Bowel sounds are normal.   Musculoskeletal:         General: Normal range of motion.      Cervical back: Normal range of motion.   Feet:      Right foot:      Protective Sensation: 3 sites tested.  2 sites sensed.      Skin integrity: Dry skin present.      Toenail Condition: Right toenails are abnormally thick and long. Fungal disease present.     Left foot:      Protective Sensation: 3 sites tested.  2 sites sensed.      Skin integrity: Dry skin present.      Toenail Condition: Left toenails are abnormally thick and long. Fungal disease present.  Skin:     General: Skin is warm and dry.   Neurological:      Mental Status: She is alert and oriented to person, place, and time.        Assessment and Plan (including Health Maintenance)      Problem List  Smart Sets  Document Outside HM   Plan:   Type 2 diabetes mellitus without complication, with long-term current use of insulin  -     insulin detemir U-100 (LEVEMIR FLEXTOUCH U-100 INSULN) 100 unit/mL (3 mL) InPn pen; Inject 20 Units into the skin every evening.  Dispense: 18 mL; Refill: 1  -     glipizide-metformin (METAGLIP) 5-500 mg per tablet; Take 2 tablets by mouth 2 (two) times daily before meals.  Dispense: 360 tablet; Refill: 1  -     Hemoglobin A1C; Future; Expected date: 02/14/2023  -     Ambulatory referral/consult to Podiatry; Future; Expected date: 03/01/2023    Essential hypertension  -     valsartan (DIOVAN) 160 MG tablet; Take 1 tablet (160 mg total) by mouth once daily.  Dispense: 90 tablet; Refill: 1  -     Basic Metabolic Panel; Future; Expected date: 02/14/2023  -     NT-Pro Natriuretic Peptide; Future; Expected date:  02/14/2023    Persistent cough  -     benzonatate (TESSALON) 100 MG capsule; Take 1 capsule (100 mg total) by mouth 3 (three) times daily as needed for Cough.  Dispense: 30 capsule; Refill: 0  -     albuterol (VENTOLIN HFA) 90 mcg/actuation inhaler; Inhale 2 puffs into the lungs every 6 (six) hours as needed for Wheezing. Rescue  Dispense: 18 g; Refill: 0    Onychomycosis  -     Ambulatory referral/consult to Podiatry; Future; Expected date: 03/01/2023       Health Maintenance Due   Topic Date Due    Pneumococcal Vaccines (Age 65+) (1 - PCV) Never done    Foot Exam  Never done    Mammogram  Never done    DEXA Scan  Never done    Colorectal Cancer Screening  Never done    COVID-19 Vaccine (5 - Booster for Pfizer series) 08/05/2022     Problem List Items Addressed This Visit          Cardiac/Vascular    Essential hypertension    Relevant Medications    valsartan (DIOVAN) 160 MG tablet    Other Relevant Orders    Basic Metabolic Panel (Completed)    NT-Pro Natriuretic Peptide (Completed)       Endocrine    Type 2 diabetes mellitus without complication, with long-term current use of insulin - Primary    Relevant Medications    insulin detemir U-100 (LEVEMIR FLEXTOUCH U-100 INSULN) 100 unit/mL (3 mL) InPn pen    glipizide-metformin (METAGLIP) 5-500 mg per tablet    Other Relevant Orders    Hemoglobin A1C (Completed)    Ambulatory referral/consult to Podiatry     Other Visit Diagnoses       Persistent cough        Relevant Medications    benzonatate (TESSALON) 100 MG capsule    albuterol (VENTOLIN HFA) 90 mcg/actuation inhaler    Onychomycosis        Relevant Orders    Ambulatory referral/consult to Podiatry          Health Maintenance Topics with due status: Not Due       Topic Last Completion Date    Diabetes Urine Screening 06/08/2022    Lipid Panel 06/08/2022    Eye Exam 06/29/2022    Low Dose Statin 02/14/2023    Hemoglobin A1c 02/14/2023     Future Appointments   Date Time Provider Department Center   6/28/2023   1:00 PM AWV NURSE, [facility] PRIMARY CARE [facility] JOSEMANUEL Isaac      Patient Instructions   Return for next appointment in 3-6 months, sooner if needed. Take all medications as prescribed. Do not stop or start any new medications without consulting with your provider. If you have access to blood pressure monitor at home, may check blood pressure as needed if symptoms of high or low blood pressure evident.    Follow up in about 4 months (around 6/14/2023).     Signature:  KENZIE Mercado      Date of encounter: 2/14/23

## 2023-02-15 LAB
ANION GAP SERPL CALCULATED.3IONS-SCNC: 7 MMOL/L (ref 7–16)
BUN SERPL-MCNC: 15 MG/DL (ref 7–18)
BUN/CREAT SERPL: 17 (ref 6–20)
CALCIUM SERPL-MCNC: 9.2 MG/DL (ref 8.5–10.1)
CHLORIDE SERPL-SCNC: 107 MMOL/L (ref 98–107)
CO2 SERPL-SCNC: 28 MMOL/L (ref 21–32)
CREAT SERPL-MCNC: 0.88 MG/DL (ref 0.55–1.02)
EGFR (NO RACE VARIABLE) (RUSH/TITUS): 73 ML/MIN/1.73M²
EST. AVERAGE GLUCOSE BLD GHB EST-MCNC: 154 MG/DL
GLUCOSE SERPL-MCNC: 95 MG/DL (ref 74–106)
HBA1C MFR BLD HPLC: 7.2 % (ref 4.5–6.6)
NT-PROBNP SERPL-MCNC: 189 PG/ML (ref 1–125)
POTASSIUM SERPL-SCNC: 4.7 MMOL/L (ref 3.5–5.1)
SODIUM SERPL-SCNC: 137 MMOL/L (ref 136–145)

## 2023-02-23 NOTE — PROGRESS NOTES
She picked up a 90 day supply on 10-17.  Rx from 11/9 is still on file;  I let the patient know she needs to pick this up from the pharmacy and start taking.

## 2023-02-23 NOTE — PROGRESS NOTES
Spoke with pt who does not know if she takes hydralazine or not; she asked me to speak to her nephew but he couldn't tell because pt has 3 bottles with no writing on them so he has no idea what they are.  None of the other bottles have that medicine printed on them.

## 2023-03-23 DIAGNOSIS — F32.A DEPRESSION, UNSPECIFIED DEPRESSION TYPE: ICD-10-CM

## 2023-03-23 RX ORDER — ESCITALOPRAM OXALATE 20 MG/1
20 TABLET ORAL DAILY
Qty: 90 TABLET | Refills: 1 | Status: SHIPPED | OUTPATIENT
Start: 2023-03-23 | End: 2023-08-01

## 2023-04-27 LAB
LEFT EYE DM RETINOPATHY: NEGATIVE
RIGHT EYE DM RETINOPATHY: NEGATIVE

## 2023-05-01 ENCOUNTER — APPOINTMENT (OUTPATIENT)
Dept: RADIOLOGY | Facility: CLINIC | Age: 68
End: 2023-05-01
Attending: REGISTERED NURSE
Payer: MEDICARE

## 2023-05-01 ENCOUNTER — OFFICE VISIT (OUTPATIENT)
Dept: FAMILY MEDICINE | Facility: CLINIC | Age: 68
End: 2023-05-01
Payer: MEDICARE

## 2023-05-01 VITALS
HEART RATE: 58 BPM | HEIGHT: 70 IN | DIASTOLIC BLOOD PRESSURE: 71 MMHG | OXYGEN SATURATION: 100 % | WEIGHT: 221.19 LBS | SYSTOLIC BLOOD PRESSURE: 130 MMHG | BODY MASS INDEX: 31.67 KG/M2 | TEMPERATURE: 99 F

## 2023-05-01 DIAGNOSIS — K21.9 GASTROESOPHAGEAL REFLUX DISEASE, UNSPECIFIED WHETHER ESOPHAGITIS PRESENT: ICD-10-CM

## 2023-05-01 DIAGNOSIS — I10 ESSENTIAL HYPERTENSION: ICD-10-CM

## 2023-05-01 DIAGNOSIS — J40 BRONCHITIS: Primary | ICD-10-CM

## 2023-05-01 DIAGNOSIS — E11.9 TYPE 2 DIABETES MELLITUS WITHOUT COMPLICATION, WITH LONG-TERM CURRENT USE OF INSULIN: ICD-10-CM

## 2023-05-01 DIAGNOSIS — Z79.4 TYPE 2 DIABETES MELLITUS WITHOUT COMPLICATION, WITH LONG-TERM CURRENT USE OF INSULIN: ICD-10-CM

## 2023-05-01 DIAGNOSIS — E03.9 HYPOTHYROIDISM, UNSPECIFIED TYPE: ICD-10-CM

## 2023-05-01 DIAGNOSIS — J40 BRONCHITIS: ICD-10-CM

## 2023-05-01 PROCEDURE — 4010F ACE/ARB THERAPY RXD/TAKEN: CPT | Mod: ,,, | Performed by: REGISTERED NURSE

## 2023-05-01 PROCEDURE — 3051F PR MOST RECENT HEMOGLOBIN A1C LEVEL 7.0 - < 8.0%: ICD-10-PCS | Mod: ,,, | Performed by: REGISTERED NURSE

## 2023-05-01 PROCEDURE — 3051F HG A1C>EQUAL 7.0%<8.0%: CPT | Mod: ,,, | Performed by: REGISTERED NURSE

## 2023-05-01 PROCEDURE — 71046 X-RAY EXAM CHEST 2 VIEWS: CPT | Mod: TC,RHCUB,FY | Performed by: REGISTERED NURSE

## 2023-05-01 PROCEDURE — 3078F DIAST BP <80 MM HG: CPT | Mod: ,,, | Performed by: REGISTERED NURSE

## 2023-05-01 PROCEDURE — 3075F PR MOST RECENT SYSTOLIC BLOOD PRESS GE 130-139MM HG: ICD-10-PCS | Mod: ,,, | Performed by: REGISTERED NURSE

## 2023-05-01 PROCEDURE — 1159F PR MEDICATION LIST DOCUMENTED IN MEDICAL RECORD: ICD-10-PCS | Mod: ,,, | Performed by: REGISTERED NURSE

## 2023-05-01 PROCEDURE — 1159F MED LIST DOCD IN RCRD: CPT | Mod: ,,, | Performed by: REGISTERED NURSE

## 2023-05-01 PROCEDURE — 3075F SYST BP GE 130 - 139MM HG: CPT | Mod: ,,, | Performed by: REGISTERED NURSE

## 2023-05-01 PROCEDURE — 4010F PR ACE/ARB THEARPY RXD/TAKEN: ICD-10-PCS | Mod: ,,, | Performed by: REGISTERED NURSE

## 2023-05-01 PROCEDURE — 3008F BODY MASS INDEX DOCD: CPT | Mod: ,,, | Performed by: REGISTERED NURSE

## 2023-05-01 PROCEDURE — 96372 PR INJECTION,THERAP/PROPH/DIAG2ST, IM OR SUBCUT: ICD-10-PCS | Mod: ,,, | Performed by: REGISTERED NURSE

## 2023-05-01 PROCEDURE — 96372 THER/PROPH/DIAG INJ SC/IM: CPT | Mod: ,,, | Performed by: REGISTERED NURSE

## 2023-05-01 PROCEDURE — 3078F PR MOST RECENT DIASTOLIC BLOOD PRESSURE < 80 MM HG: ICD-10-PCS | Mod: ,,, | Performed by: REGISTERED NURSE

## 2023-05-01 PROCEDURE — 1160F RVW MEDS BY RX/DR IN RCRD: CPT | Mod: ,,, | Performed by: REGISTERED NURSE

## 2023-05-01 PROCEDURE — 99214 OFFICE O/P EST MOD 30 MIN: CPT | Mod: 25,,, | Performed by: REGISTERED NURSE

## 2023-05-01 PROCEDURE — 3008F PR BODY MASS INDEX (BMI) DOCUMENTED: ICD-10-PCS | Mod: ,,, | Performed by: REGISTERED NURSE

## 2023-05-01 PROCEDURE — 1160F PR REVIEW ALL MEDS BY PRESCRIBER/CLIN PHARMACIST DOCUMENTED: ICD-10-PCS | Mod: ,,, | Performed by: REGISTERED NURSE

## 2023-05-01 PROCEDURE — 99214 PR OFFICE/OUTPT VISIT, EST, LEVL IV, 30-39 MIN: ICD-10-PCS | Mod: 25,,, | Performed by: REGISTERED NURSE

## 2023-05-01 RX ORDER — LEVOTHYROXINE SODIUM 75 UG/1
75 TABLET ORAL EVERY MORNING
Qty: 90 TABLET | Refills: 1 | Status: SHIPPED | OUTPATIENT
Start: 2023-05-01 | End: 2023-11-06 | Stop reason: SDUPTHER

## 2023-05-01 RX ORDER — HYDRALAZINE HYDROCHLORIDE 25 MG/1
25 TABLET, FILM COATED ORAL 3 TIMES DAILY
Qty: 270 TABLET | Refills: 1 | Status: SHIPPED | OUTPATIENT
Start: 2023-05-01 | End: 2023-11-06 | Stop reason: SDUPTHER

## 2023-05-01 RX ORDER — PROMETHAZINE HYDROCHLORIDE AND DEXTROMETHORPHAN HYDROBROMIDE 6.25; 15 MG/5ML; MG/5ML
5 SYRUP ORAL EVERY 8 HOURS PRN
Qty: 200 ML | Refills: 0 | Status: SHIPPED | OUTPATIENT
Start: 2023-05-01 | End: 2023-05-11

## 2023-05-01 RX ORDER — CETIRIZINE HYDROCHLORIDE 10 MG/1
10 TABLET ORAL NIGHTLY
Qty: 90 TABLET | Refills: 1 | Status: SHIPPED | OUTPATIENT
Start: 2023-05-01 | End: 2023-08-01

## 2023-05-01 RX ORDER — ESOMEPRAZOLE MAGNESIUM 40 MG/1
40 CAPSULE, DELAYED RELEASE ORAL DAILY
Qty: 90 CAPSULE | Refills: 1 | Status: SHIPPED | OUTPATIENT
Start: 2023-05-01 | End: 2023-11-06 | Stop reason: SDUPTHER

## 2023-05-01 RX ORDER — DAPAGLIFLOZIN 10 MG/1
10 TABLET, FILM COATED ORAL DAILY
Qty: 90 TABLET | Refills: 1 | Status: SHIPPED | OUTPATIENT
Start: 2023-05-01 | End: 2023-10-28

## 2023-05-01 RX ORDER — AZITHROMYCIN 250 MG/1
TABLET, FILM COATED ORAL
Qty: 6 TABLET | Refills: 0 | Status: SHIPPED | OUTPATIENT
Start: 2023-05-01 | End: 2023-05-06

## 2023-05-01 RX ORDER — DEXAMETHASONE SODIUM PHOSPHATE 4 MG/ML
6 INJECTION, SOLUTION INTRA-ARTICULAR; INTRALESIONAL; INTRAMUSCULAR; INTRAVENOUS; SOFT TISSUE
Status: COMPLETED | OUTPATIENT
Start: 2023-05-01 | End: 2023-05-01

## 2023-05-01 RX ADMIN — DEXAMETHASONE SODIUM PHOSPHATE 6 MG: 4 INJECTION, SOLUTION INTRA-ARTICULAR; INTRALESIONAL; INTRAMUSCULAR; INTRAVENOUS; SOFT TISSUE at 04:05

## 2023-05-01 NOTE — PROGRESS NOTES
KENZIE Mercado        PATIENT NAME: Estefania Urias  : 1955  DATE: 23  MRN: [id number]      Billing Provider: KENZIE Mercado  Level of Service: [place] OFFICE/OUTPT VISIT, EST, SILVIAL IV, 30-39 MIN  Patient PCP Information       Provider PCP Type    KENZIE Mercado General            Reason for Visit / Chief Complaint: Cough (Chronic cough for over a year and getting worse; this episode started about 1 week ago), Medication Refill (refer), and Referral (Needs mammogram screening)       Update PCP  Update Chief Complaint         History of Present Illness / Problem Focused Workflow     Cough  This is a chronic problem. The current episode started more than 1 year ago. The problem has been gradually worsening. The problem occurs every few minutes. The cough is Productive of blood-tinged sputum. Associated symptoms include hemoptysis, postnasal drip, rhinorrhea and shortness of breath. Pertinent negatives include no chest pain, chills, ear congestion, ear pain, fever, headaches, sore throat, sweats, weight loss or wheezing. The symptoms are aggravated by lying down, dust, exercise and fumes. She has tried OTC cough suppressant, rest, cool air and body position changes for the symptoms. The treatment provided mild relief. Her past medical history is significant for asthma, bronchitis, environmental allergies and pneumonia. There is no history of bronchiectasis, COPD or emphysema.     Review of Systems     Review of Systems   Constitutional:  Negative for chills, fever and weight loss.   HENT:  Positive for postnasal drip and rhinorrhea. Negative for ear pain and sore throat.    Respiratory:  Positive for cough, hemoptysis and shortness of breath. Negative for wheezing.    Cardiovascular:  Negative for chest pain.   Allergic/Immunologic: Positive for environmental allergies.   Neurological:  Negative for headaches.      Medical / Social / Family History     Past Medical History:   Diagnosis Date     Hyperlipidemia      Past Surgical History:   Procedure Laterality Date    HYSTERECTOMY       Social History  Ms. Estefania Urias  reports that she has never smoked. She has been exposed to tobacco smoke. She has never used smokeless tobacco. She reports that she does not drink alcohol and does not use drugs.    Family History  Ms. Estefania Urias's family history includes Diabetes in her mother; Heart disease in her father.    Medications and Allergies     Medications  No outpatient medications have been marked as taking for the 5/1/23 encounter (Office Visit) with KENZIE Mercado.     Allergies  Review of patient's allergies indicates:  No Known Allergies    Physical Examination     Vitals:    05/01/23 1458   BP: 130/71   Pulse: (!) 58   Temp: 98.7 °F (37.1 °C)     Physical Exam  Vitals and nursing note reviewed.   Constitutional:       Appearance: She is ill-appearing.   HENT:      Head: Normocephalic and atraumatic.      Nose: Congestion present. No rhinorrhea.      Mouth/Throat:      Mouth: Mucous membranes are moist.      Pharynx: Posterior oropharyngeal erythema present.   Cardiovascular:      Rate and Rhythm: Normal rate and regular rhythm.      Heart sounds: Normal heart sounds.   Pulmonary:      Breath sounds: Wheezing present.   Musculoskeletal:      Comments: Stiffness and decrease range of motion in lumbar. Pain in bilateral knees occurs daily. Mild to moderate in severity. No recent falls or accidents.    Skin:     General: Skin is warm and dry.   Neurological:      Mental Status: She is alert. Mental status is at baseline.        Assessment and Plan (including Health Maintenance)      Problem List  Smart Sets  Document Outside    Plan:   Bronchitis  -     X-Ray Chest PA And Lateral; Future; Expected date: 05/01/2023  -     dexAMETHasone injection 6 mg  -     cetirizine (ZYRTEC) 10 MG tablet; Take 1 tablet (10 mg total) by mouth nightly.  Dispense: 90 tablet; Refill: 1  -      promethazine-dextromethorphan (PROMETHAZINE-DM) 6.25-15 mg/5 mL Syrp; Take 5 mLs by mouth every 8 (eight) hours as needed (cough).  Dispense: 200 mL; Refill: 0  -     azithromycin (Z-AIDE) 250 MG tablet; Take 2 tablets by mouth on day 1; Take 1 tablet by mouth on days 2-5  Dispense: 6 tablet; Refill: 0    Gastroesophageal reflux disease, unspecified whether esophagitis present  -     esomeprazole (NEXIUM) 40 MG capsule; Take 1 capsule (40 mg total) by mouth once daily.  Dispense: 90 capsule; Refill: 1    Hypothyroidism, unspecified type  -     levothyroxine (SYNTHROID) 75 MCG tablet; Take 1 tablet (75 mcg total) by mouth every morning.  Dispense: 90 tablet; Refill: 1    Essential hypertension  -     hydrALAZINE (APRESOLINE) 25 MG tablet; Take 1 tablet (25 mg total) by mouth 3 (three) times daily.  Dispense: 270 tablet; Refill: 1    Type 2 diabetes mellitus without complication, with long-term current use of insulin  -     FARXIGA 10 mg tablet; Take 1 tablet (10 mg total) by mouth once daily.  Dispense: 90 tablet; Refill: 1      Health Maintenance Due   Topic Date Due    Pneumococcal Vaccines (Age 65+) (1 - PCV) Never done    Foot Exam  Never done    HIV Screening  Never done    Mammogram  Never done    DEXA Scan  Never done    Colorectal Cancer Screening  Never done    Shingles Vaccine (1 of 2) Never done    TETANUS VACCINE  07/15/2020    COVID-19 Vaccine (5 - Pfizer series) 08/05/2022    Lipid Panel  06/08/2023    Diabetes Urine Screening  06/08/2023       Problem List Items Addressed This Visit          Cardiac/Vascular    Essential hypertension    Relevant Medications    hydrALAZINE (APRESOLINE) 25 MG tablet       Endocrine    Type 2 diabetes mellitus without complication, with long-term current use of insulin    Relevant Medications    FARXIGA 10 mg tablet     Other Visit Diagnoses       Bronchitis    -  Primary    Relevant Medications    cetirizine (ZYRTEC) 10 MG tablet    Other Relevant Orders    X-Ray Chest PA  And Lateral (Completed)    Gastroesophageal reflux disease, unspecified whether esophagitis present        Relevant Medications    esomeprazole (NEXIUM) 40 MG capsule    Hypothyroidism, unspecified type        Relevant Medications    levothyroxine (SYNTHROID) 75 MCG tablet          Health Maintenance Topics with due status: Not Due       Topic Last Completion Date    Hemoglobin A1c 02/14/2023    Low Dose Statin 04/04/2023    Eye Exam 04/27/2023     No future appointments.       Patient Instructions   Increase fluid intake to stay hydrated. Take all doses of antibiotic therapy as prescribed. Do not stop taking when symptoms resolve, complete dosing. May take Tylenol or ibuprofen for fever or pain. Stay active, move around at least every 2 hours when awake. Go to the ED for any worsened condition or difficulty breathing. Return to clinic if condition persists.    Follow up in about 3 months (around 8/1/2023), or if symptoms worsen or fail to improve, for HTN.     Signature:  KENZIE Mercado      Date of encounter: 5/1/23

## 2023-05-22 ENCOUNTER — PATIENT OUTREACH (OUTPATIENT)
Dept: ADMINISTRATIVE | Facility: HOSPITAL | Age: 68
End: 2023-05-22

## 2023-05-22 NOTE — PROGRESS NOTES
Health Maintenance Due   Topic Date Due    Pneumococcal Vaccines (Age 65+) (1 - PCV) Never done    Foot Exam  Never done    HIV Screening  Never done    Mammogram  Never done    DEXA Scan  Never done    Colorectal Cancer Screening  Never done    Shingles Vaccine (1 of 2) Never done    TETANUS VACCINE  07/15/2020    COVID-19 Vaccine (5 - Booster for Pfizer series) 08/05/2022    Diabetes Urine Screening  06/08/2023     HM TOPICS DUE  Uploaded pt eye exam from [facility]

## 2023-05-24 RX ORDER — PEN NEEDLE, DIABETIC 32GX 5/32"
NEEDLE, DISPOSABLE MISCELLANEOUS
Qty: 100 EACH | Refills: 2 | Status: SHIPPED | OUTPATIENT
Start: 2023-05-24 | End: 2023-11-06 | Stop reason: SDUPTHER

## 2023-06-09 DIAGNOSIS — Z71.89 COMPLEX CARE COORDINATION: ICD-10-CM

## 2023-07-20 ENCOUNTER — PATIENT MESSAGE (OUTPATIENT)
Dept: ADMINISTRATIVE | Facility: HOSPITAL | Age: 68
End: 2023-07-20

## 2023-08-01 ENCOUNTER — OFFICE VISIT (OUTPATIENT)
Dept: FAMILY MEDICINE | Facility: CLINIC | Age: 68
End: 2023-08-01
Payer: MEDICARE

## 2023-08-01 VITALS
SYSTOLIC BLOOD PRESSURE: 134 MMHG | OXYGEN SATURATION: 97 % | HEART RATE: 69 BPM | DIASTOLIC BLOOD PRESSURE: 76 MMHG | BODY MASS INDEX: 32.35 KG/M2 | TEMPERATURE: 98 F | WEIGHT: 226 LBS | HEIGHT: 70 IN

## 2023-08-01 DIAGNOSIS — E78.5 DYSLIPIDEMIA: ICD-10-CM

## 2023-08-01 DIAGNOSIS — Z12.31 ENCOUNTER FOR SCREENING MAMMOGRAM FOR MALIGNANT NEOPLASM OF BREAST: ICD-10-CM

## 2023-08-01 DIAGNOSIS — I10 ESSENTIAL HYPERTENSION: Primary | ICD-10-CM

## 2023-08-01 DIAGNOSIS — E03.9 HYPOTHYROIDISM, UNSPECIFIED TYPE: ICD-10-CM

## 2023-08-01 DIAGNOSIS — E11.9 TYPE 2 DIABETES MELLITUS WITHOUT COMPLICATION, WITH LONG-TERM CURRENT USE OF INSULIN: ICD-10-CM

## 2023-08-01 DIAGNOSIS — Z79.4 TYPE 2 DIABETES MELLITUS WITHOUT COMPLICATION, WITH LONG-TERM CURRENT USE OF INSULIN: ICD-10-CM

## 2023-08-01 DIAGNOSIS — M17.11 OSTEOARTHRITIS OF RIGHT KNEE, UNSPECIFIED OSTEOARTHRITIS TYPE: ICD-10-CM

## 2023-08-01 PROBLEM — E04.1 THYROID NODULE: Status: ACTIVE | Noted: 2023-08-01

## 2023-08-01 PROBLEM — E04.2 MULTINODULAR GOITER: Status: RESOLVED | Noted: 2023-08-01 | Resolved: 2023-08-01

## 2023-08-01 PROBLEM — E04.2 MULTINODULAR GOITER: Status: ACTIVE | Noted: 2023-08-01

## 2023-08-01 LAB
ALBUMIN SERPL BCP-MCNC: 3.5 G/DL (ref 3.5–5)
ALBUMIN/GLOB SERPL: 0.8 {RATIO}
ALP SERPL-CCNC: 93 U/L (ref 55–142)
ALT SERPL W P-5'-P-CCNC: 11 U/L (ref 13–56)
ANION GAP SERPL CALCULATED.3IONS-SCNC: 9 MMOL/L (ref 7–16)
AST SERPL W P-5'-P-CCNC: 6 U/L (ref 15–37)
BASOPHILS # BLD AUTO: 0.05 K/UL (ref 0–0.2)
BASOPHILS NFR BLD AUTO: 0.5 % (ref 0–1)
BILIRUB SERPL-MCNC: 0.2 MG/DL (ref ?–1.2)
BILIRUB UR QL STRIP: NEGATIVE
BUN SERPL-MCNC: 16 MG/DL (ref 7–18)
BUN/CREAT SERPL: 20 (ref 6–20)
CALCIUM SERPL-MCNC: 9.1 MG/DL (ref 8.5–10.1)
CHLORIDE SERPL-SCNC: 110 MMOL/L (ref 98–107)
CHOLEST SERPL-MCNC: 100 MG/DL (ref 0–200)
CHOLEST/HDLC SERPL: 3 {RATIO}
CLARITY UR: CLEAR
CO2 SERPL-SCNC: 27 MMOL/L (ref 21–32)
COLOR UR: COLORLESS
CREAT SERPL-MCNC: 0.82 MG/DL (ref 0.55–1.02)
CREAT UR-MCNC: <13 MG/DL (ref 28–219)
DIFFERENTIAL METHOD BLD: ABNORMAL
EGFR (NO RACE VARIABLE) (RUSH/TITUS): 79 ML/MIN/1.73M2
EOSINOPHIL # BLD AUTO: 0.25 K/UL (ref 0–0.5)
EOSINOPHIL NFR BLD AUTO: 2.6 % (ref 1–4)
ERYTHROCYTE [DISTWIDTH] IN BLOOD BY AUTOMATED COUNT: 15.2 % (ref 11.5–14.5)
EST. AVERAGE GLUCOSE BLD GHB EST-MCNC: 224 MG/DL
GLOBULIN SER-MCNC: 4.3 G/DL (ref 2–4)
GLUCOSE SERPL-MCNC: 134 MG/DL (ref 74–106)
GLUCOSE UR STRIP-MCNC: >1000 MG/DL
HBA1C MFR BLD HPLC: 9.3 % (ref 4.5–6.6)
HCT VFR BLD AUTO: 39 % (ref 38–47)
HDLC SERPL-MCNC: 33 MG/DL (ref 40–60)
HGB BLD-MCNC: 12.3 G/DL (ref 12–16)
IMM GRANULOCYTES # BLD AUTO: 0.03 K/UL (ref 0–0.04)
IMM GRANULOCYTES NFR BLD: 0.3 % (ref 0–0.4)
KETONES UR STRIP-SCNC: NEGATIVE MG/DL
LDLC SERPL CALC-MCNC: 46 MG/DL
LDLC/HDLC SERPL: 1.4 {RATIO}
LEUKOCYTE ESTERASE UR QL STRIP: NEGATIVE
LYMPHOCYTES # BLD AUTO: 3.22 K/UL (ref 1–4.8)
LYMPHOCYTES NFR BLD AUTO: 33.4 % (ref 27–41)
MCH RBC QN AUTO: 27.1 PG (ref 27–31)
MCHC RBC AUTO-ENTMCNC: 31.5 G/DL (ref 32–36)
MCV RBC AUTO: 85.9 FL (ref 80–96)
MICROALBUMIN UR-MCNC: <0.5 MG/DL (ref 0–2.8)
MICROALBUMIN/CREAT RATIO PNL UR: ABNORMAL
MONOCYTES # BLD AUTO: 0.61 K/UL (ref 0–0.8)
MONOCYTES NFR BLD AUTO: 6.3 % (ref 2–6)
MPC BLD CALC-MCNC: 11.7 FL (ref 9.4–12.4)
NEUTROPHILS # BLD AUTO: 5.47 K/UL (ref 1.8–7.7)
NEUTROPHILS NFR BLD AUTO: 56.9 % (ref 53–65)
NITRITE UR QL STRIP: NEGATIVE
NONHDLC SERPL-MCNC: 67 MG/DL
NRBC # BLD AUTO: 0 X10E3/UL
NRBC, AUTO (.00): 0 %
PH UR STRIP: 6.5 PH UNITS
PLATELET # BLD AUTO: 263 K/UL (ref 150–400)
POTASSIUM SERPL-SCNC: 4.1 MMOL/L (ref 3.5–5.1)
PROT SERPL-MCNC: 7.8 G/DL (ref 6.4–8.2)
PROT UR QL STRIP: NEGATIVE
RBC # BLD AUTO: 4.54 M/UL (ref 4.2–5.4)
RBC # UR STRIP: NEGATIVE /UL
SODIUM SERPL-SCNC: 142 MMOL/L (ref 136–145)
SP GR UR STRIP: 1
TRIGL SERPL-MCNC: 107 MG/DL (ref 35–150)
TSH SERPL DL<=0.005 MIU/L-ACNC: 2.64 UIU/ML (ref 0.36–3.74)
UROBILINOGEN UR STRIP-ACNC: NORMAL MG/DL
VLDLC SERPL-MCNC: 21 MG/DL
WBC # BLD AUTO: 9.63 K/UL (ref 4.5–11)

## 2023-08-01 PROCEDURE — 84443 TSH: ICD-10-PCS | Mod: ,,, | Performed by: CLINICAL MEDICAL LABORATORY

## 2023-08-01 PROCEDURE — 3075F SYST BP GE 130 - 139MM HG: CPT | Mod: ,,, | Performed by: REGISTERED NURSE

## 2023-08-01 PROCEDURE — 3078F DIAST BP <80 MM HG: CPT | Mod: ,,, | Performed by: REGISTERED NURSE

## 2023-08-01 PROCEDURE — 1159F PR MEDICATION LIST DOCUMENTED IN MEDICAL RECORD: ICD-10-PCS | Mod: ,,, | Performed by: REGISTERED NURSE

## 2023-08-01 PROCEDURE — 82570 MICROALBUMIN / CREATININE RATIO URINE: ICD-10-PCS | Mod: ,,, | Performed by: CLINICAL MEDICAL LABORATORY

## 2023-08-01 PROCEDURE — 83036 HEMOGLOBIN A1C: ICD-10-PCS | Mod: ,,, | Performed by: CLINICAL MEDICAL LABORATORY

## 2023-08-01 PROCEDURE — 1160F PR REVIEW ALL MEDS BY PRESCRIBER/CLIN PHARMACIST DOCUMENTED: ICD-10-PCS | Mod: ,,, | Performed by: REGISTERED NURSE

## 2023-08-01 PROCEDURE — 1101F PR PT FALLS ASSESS DOC 0-1 FALLS W/OUT INJ PAST YR: ICD-10-PCS | Mod: ,,, | Performed by: REGISTERED NURSE

## 2023-08-01 PROCEDURE — 99213 PR OFFICE/OUTPT VISIT, EST, LEVL III, 20-29 MIN: ICD-10-PCS | Mod: ,,, | Performed by: REGISTERED NURSE

## 2023-08-01 PROCEDURE — 3008F PR BODY MASS INDEX (BMI) DOCUMENTED: ICD-10-PCS | Mod: ,,, | Performed by: REGISTERED NURSE

## 2023-08-01 PROCEDURE — 80061 LIPID PANEL: CPT | Mod: ,,, | Performed by: CLINICAL MEDICAL LABORATORY

## 2023-08-01 PROCEDURE — 1159F MED LIST DOCD IN RCRD: CPT | Mod: ,,, | Performed by: REGISTERED NURSE

## 2023-08-01 PROCEDURE — 82043 UR ALBUMIN QUANTITATIVE: CPT | Mod: ,,, | Performed by: CLINICAL MEDICAL LABORATORY

## 2023-08-01 PROCEDURE — 80053 COMPREHEN METABOLIC PANEL: CPT | Mod: ,,, | Performed by: CLINICAL MEDICAL LABORATORY

## 2023-08-01 PROCEDURE — 80061 LIPID PANEL: ICD-10-PCS | Mod: ,,, | Performed by: CLINICAL MEDICAL LABORATORY

## 2023-08-01 PROCEDURE — 4010F ACE/ARB THERAPY RXD/TAKEN: CPT | Mod: ,,, | Performed by: REGISTERED NURSE

## 2023-08-01 PROCEDURE — 83036 HEMOGLOBIN GLYCOSYLATED A1C: CPT | Mod: ,,, | Performed by: CLINICAL MEDICAL LABORATORY

## 2023-08-01 PROCEDURE — 3008F BODY MASS INDEX DOCD: CPT | Mod: ,,, | Performed by: REGISTERED NURSE

## 2023-08-01 PROCEDURE — 82043 MICROALBUMIN / CREATININE RATIO URINE: ICD-10-PCS | Mod: ,,, | Performed by: CLINICAL MEDICAL LABORATORY

## 2023-08-01 PROCEDURE — 4010F PR ACE/ARB THEARPY RXD/TAKEN: ICD-10-PCS | Mod: ,,, | Performed by: REGISTERED NURSE

## 2023-08-01 PROCEDURE — 84443 ASSAY THYROID STIM HORMONE: CPT | Mod: ,,, | Performed by: CLINICAL MEDICAL LABORATORY

## 2023-08-01 PROCEDURE — 3078F PR MOST RECENT DIASTOLIC BLOOD PRESSURE < 80 MM HG: ICD-10-PCS | Mod: ,,, | Performed by: REGISTERED NURSE

## 2023-08-01 PROCEDURE — 1160F RVW MEDS BY RX/DR IN RCRD: CPT | Mod: ,,, | Performed by: REGISTERED NURSE

## 2023-08-01 PROCEDURE — 3075F PR MOST RECENT SYSTOLIC BLOOD PRESS GE 130-139MM HG: ICD-10-PCS | Mod: ,,, | Performed by: REGISTERED NURSE

## 2023-08-01 PROCEDURE — 99213 OFFICE O/P EST LOW 20 MIN: CPT | Mod: ,,, | Performed by: REGISTERED NURSE

## 2023-08-01 PROCEDURE — 82570 ASSAY OF URINE CREATININE: CPT | Mod: ,,, | Performed by: CLINICAL MEDICAL LABORATORY

## 2023-08-01 PROCEDURE — 85025 CBC WITH DIFFERENTIAL: ICD-10-PCS | Mod: ,,, | Performed by: CLINICAL MEDICAL LABORATORY

## 2023-08-01 PROCEDURE — 81003 URINALYSIS, REFLEX TO URINE CULTURE: ICD-10-PCS | Mod: QW,,, | Performed by: CLINICAL MEDICAL LABORATORY

## 2023-08-01 PROCEDURE — 3046F HEMOGLOBIN A1C LEVEL >9.0%: CPT | Mod: ,,, | Performed by: REGISTERED NURSE

## 2023-08-01 PROCEDURE — 1126F PR PAIN SEVERITY QUANTIFIED, NO PAIN PRESENT: ICD-10-PCS | Mod: ,,, | Performed by: REGISTERED NURSE

## 2023-08-01 PROCEDURE — 80053 COMPREHENSIVE METABOLIC PANEL: ICD-10-PCS | Mod: ,,, | Performed by: CLINICAL MEDICAL LABORATORY

## 2023-08-01 PROCEDURE — 3046F PR MOST RECENT HEMOGLOBIN A1C LEVEL > 9.0%: ICD-10-PCS | Mod: ,,, | Performed by: REGISTERED NURSE

## 2023-08-01 PROCEDURE — 3288F PR FALLS RISK ASSESSMENT DOCUMENTED: ICD-10-PCS | Mod: ,,, | Performed by: REGISTERED NURSE

## 2023-08-01 PROCEDURE — 1126F AMNT PAIN NOTED NONE PRSNT: CPT | Mod: ,,, | Performed by: REGISTERED NURSE

## 2023-08-01 PROCEDURE — 85025 COMPLETE CBC W/AUTO DIFF WBC: CPT | Mod: ,,, | Performed by: CLINICAL MEDICAL LABORATORY

## 2023-08-01 PROCEDURE — 3288F FALL RISK ASSESSMENT DOCD: CPT | Mod: ,,, | Performed by: REGISTERED NURSE

## 2023-08-01 PROCEDURE — 81003 URINALYSIS AUTO W/O SCOPE: CPT | Mod: QW,,, | Performed by: CLINICAL MEDICAL LABORATORY

## 2023-08-01 PROCEDURE — 1101F PT FALLS ASSESS-DOCD LE1/YR: CPT | Mod: ,,, | Performed by: REGISTERED NURSE

## 2023-08-01 RX ORDER — METOPROLOL SUCCINATE 25 MG/1
25 TABLET, EXTENDED RELEASE ORAL DAILY
Qty: 90 TABLET | Refills: 1 | Status: SHIPPED | OUTPATIENT
Start: 2023-08-01 | End: 2024-01-28

## 2023-08-01 RX ORDER — ATORVASTATIN CALCIUM 40 MG/1
40 TABLET, FILM COATED ORAL DAILY
Qty: 90 TABLET | Refills: 1 | Status: SHIPPED | OUTPATIENT
Start: 2023-08-01

## 2023-08-01 RX ORDER — MELOXICAM 7.5 MG/1
7.5 TABLET ORAL DAILY PRN
Qty: 30 TABLET | Refills: 1 | Status: SHIPPED | OUTPATIENT
Start: 2023-08-01 | End: 2023-09-30

## 2023-08-01 RX ORDER — GLIPIZIDE AND METFORMIN HCL 5; 500 MG/1; MG/1
2 TABLET, FILM COATED ORAL
Qty: 360 TABLET | Refills: 1 | Status: SHIPPED | OUTPATIENT
Start: 2023-08-01 | End: 2024-01-28

## 2023-08-01 NOTE — PROGRESS NOTES
KENZIE Mercado        PATIENT NAME: Estefania Urias  : 1955  DATE: 23  MRN: [id number]      Billing Provider: KENZIE Mercado  Level of Service: [place] OFFICE/OUTPT VISIT, EST, LEVL III, 20-29 MIN  Patient PCP Information       Provider PCP Type    KENZIE Mercado General            Reason for Visit / Chief Complaint: Follow-up, Medication Refill, and Referral (Mammogram)       Update PCP  Update Chief Complaint         History of Present Illness / Problem Focused Workflow      HPI Ms. Urias is a pleasant 67-year-old AAF here for Diabetes and Hypertension follow-up. She reports she has been doing well, has medications for review and reconciliation. She had referral for mammogram placed 2022, states no one ever called to schedule at [facility].     Review of Systems     Review of Systems   Constitutional:  Positive for fatigue.   HENT: Negative.     Eyes:         History of Graves disease, bulging eyes.    Respiratory: Negative.     Cardiovascular: Negative.    Gastrointestinal: Negative.    Genitourinary: Negative.    Musculoskeletal:  Positive for arthralgias, gait problem, joint swelling, leg pain, myalgias and neck stiffness.   Psychiatric/Behavioral: Negative.        Medical / Social / Family History     Past Medical History:   Diagnosis Date    Hyperlipidemia     Thyroid nodule 2023       Past Surgical History:   Procedure Laterality Date    HYSTERECTOMY         Social History  Ms. Estefania Urias  reports that she has never smoked. She has been exposed to tobacco smoke. She has never used smokeless tobacco. She reports that she does not drink alcohol and does not use drugs.    Family History  Ms. Estefania Urias's family history includes Diabetes in her mother; Heart disease in her father.    Medications and Allergies     Medications  Outpatient Medications Marked as Taking for the 23 encounter (Office Visit) with KENZIE Mercado   Medication Sig  "Dispense Refill    aspirin (ECOTRIN) 81 MG EC tablet Take 81 mg by mouth once daily.      COMBIGAN 0.2-0.5 % Drop Place 1 drop into both eyes 2 (two) times daily.      esomeprazole (NEXIUM) 40 MG capsule Take 1 capsule (40 mg total) by mouth once daily. 90 capsule 1    FARXIGA 10 mg tablet Take 1 tablet (10 mg total) by mouth once daily. 90 tablet 1    hydrALAZINE (APRESOLINE) 25 MG tablet Take 1 tablet (25 mg total) by mouth 3 (three) times daily. 270 tablet 1    insulin detemir U-100 (LEVEMIR FLEXTOUCH U-100 INSULN) 100 unit/mL (3 mL) InPn pen Inject 20 Units into the skin every evening. 18 mL 1    latanoprost 0.005 % ophthalmic solution Place 1 drop into both eyes nightly.      levothyroxine (SYNTHROID) 75 MCG tablet Take 1 tablet (75 mcg total) by mouth every morning. 90 tablet 1    pen needle, diabetic ([employer] PEN NEEDLE) 32 gauge x 5/32" Ndle USE with levemir AS DIRECTED 100 each 2    valsartan (DIOVAN) 160 MG tablet Take 1 tablet (160 mg total) by mouth once daily. 90 tablet 1    [DISCONTINUED] atorvastatin (LIPITOR) 40 MG tablet TAKE ONE TABLET BY MOUTH EVERY DAY 90 tablet 1    [DISCONTINUED] glipizide-metformin (METAGLIP) 5-500 mg per tablet Take 2 tablets by mouth 2 (two) times daily before meals. 360 tablet 1     Allergies  Review of patient's allergies indicates:  No Known Allergies    Physical Examination     Vitals:    08/01/23 1342   BP: 134/76   Pulse: 69   Temp: 98.1 °F (36.7 °C)     Physical Exam  Vitals and nursing note reviewed.   Constitutional:       Appearance: Normal appearance.   HENT:      Head: Normocephalic and atraumatic.      Nose: Nose normal.   Eyes:      Comments: Bulging eyes from Graves Disease.    Cardiovascular:      Rate and Rhythm: Normal rate and regular rhythm.      Heart sounds: Normal heart sounds.   Pulmonary:      Effort: Pulmonary effort is normal.      Breath sounds: Normal breath sounds.   Musculoskeletal:      Cervical back: Normal range of motion.      Comments: " Arthritis affecting multiple joints. Recurrent pain in right knee. Severity ranges from mild to moderate, she has noticeable limp when pain in right knee is more severe.    Skin:     General: Skin is warm and dry.   Neurological:      Mental Status: She is alert and oriented to person, place, and time.        Assessment and Plan (including Health Maintenance)      Problem List  Smart Sets  Document Outside    Plan:   Essential hypertension  -     CBC Auto Differential; Future; Expected date: 08/01/2023  -     Comprehensive Metabolic Panel; Future; Expected date: 08/01/2023  -     Urinalysis, Reflex to Urine Culture; Future; Expected date: 08/01/2023  -     metoprolol succinate (TOPROL-XL) 25 MG 24 hr tablet; Take 1 tablet (25 mg total) by mouth once daily.  Dispense: 90 tablet; Refill: 1    Encounter for screening mammogram for malignant neoplasm of breast  Comments:  To be scheduled at [facility].   Orders:  -     Mammo Digital Screening Bilat; Future; Expected date: 08/01/2023    Hypothyroidism, unspecified type  Comments:  Continue current medication regimen, will review lab results. She has not had follow-up with Endocrinologist in the past 1-2 years, will defer back as needed.   Orders:  -     TSH; Future; Expected date: 08/01/2023    Type 2 diabetes mellitus without complication, with long-term current use of insulin  Comments:  Will review lab work, continue current medication regimen. Encouraged her to check fingerstick BS 2-3 times per day per insulin dependence.   Orders:  -     Microalbumin/Creatinine Ratio, Urine; Future; Expected date: 08/01/2023  -     Lipid Panel; Future; Expected date: 08/01/2023  -     Hemoglobin A1C; Future; Expected date: 08/01/2023  -     glipizide-metformin (METAGLIP) 5-500 mg per tablet; Take 2 tablets by mouth 2 (two) times daily before meals.  Dispense: 360 tablet; Refill: 1    Dyslipidemia  -     atorvastatin (LIPITOR) 40 MG tablet; Take 1  tablet (40 mg total) by mouth once daily.  Dispense: 90 tablet; Refill: 1    Osteoarthritis of right knee, unspecified osteoarthritis type  Comments:  Start Mobic 7.5mg, 1 tablet once per day as needed for arthritis of right knee.   Orders:  -     meloxicam (MOBIC) 7.5 MG tablet; Take 1 tablet (7.5 mg total) by mouth daily as needed (Knee Pain).  Dispense: 30 tablet; Refill: 1      Health Maintenance Due   Topic Date Due    Pneumococcal Vaccines (Age 65+) (1 - PCV) Never done    Foot Exam  Never done    HIV Screening  Never done    Mammogram  Never done    DEXA Scan  Never done    Colorectal Cancer Screening  Never done    Shingles Vaccine (1 of 2) Never done    TETANUS VACCINE  07/15/2020    COVID-19 Vaccine (5 - Pfizer series) 08/05/2022       Problem List Items Addressed This Visit          Cardiac/Vascular    Essential hypertension - Primary    Relevant Medications    metoprolol succinate (TOPROL-XL) 25 MG 24 hr tablet    Other Relevant Orders    CBC Auto Differential (Completed)    Comprehensive Metabolic Panel (Completed)    Urinalysis, Reflex to Urine Culture (Completed)       Endocrine    Type 2 diabetes mellitus without complication, with long-term current use of insulin    Relevant Medications    glipizide-metformin (METAGLIP) 5-500 mg per tablet    Other Relevant Orders    Microalbumin/Creatinine Ratio, Urine (Completed)    Lipid Panel (Completed)    Hemoglobin A1C (Completed)       Orthopedic    Osteoarthritis of knee    Relevant Medications    meloxicam (MOBIC) 7.5 MG tablet     Other Visit Diagnoses       Encounter for screening mammogram for malignant neoplasm of breast        To be scheduled at [facility].     Relevant Orders    Mammo Digital Screening Bilat    Hypothyroidism, unspecified type        Continue current medication regimen, will review lab results. She has not had follow-up with Endocrinologist in the past 1-2 years, will defer back as needed.     Relevant  Orders    TSH (Completed)    Dyslipidemia        Relevant Medications    atorvastatin (LIPITOR) 40 MG tablet          Health Maintenance Topics with due status: Not Due       Topic Last Completion Date    Influenza Vaccine 11/04/2022    Eye Exam 04/27/2023    Low Dose Statin 08/01/2023    Diabetes Urine Screening 08/01/2023    Lipid Panel 08/01/2023    Hemoglobin A1c 08/01/2023     Future Appointments   Date Time Provider Department Center   11/1/2023  3:00 PM KENZIE Mercado [facility]       Patient Instructions   Return for next appointment in 3 months, sooner if needed. Take all medications as prescribed. Do not stop or start any new medications without consulting with your provider. If you have access to blood pressure monitor at home, may check blood pressure as needed if symptoms of high or low blood pressure evident.    Follow up in about 3 months (around 11/1/2023).     Signature:  KENZIE Mercado      Date of encounter: 8/1/23

## 2023-08-02 NOTE — PROGRESS NOTES
Per pharmacy, she picked up Farxiga on 7/26 and Levemir on 5/26; pt states she is taking 20 units of Levemir every day and Farxiga two times daily.  It is ok with her to change med, if insurance will pay for new.

## 2023-08-14 ENCOUNTER — EXTERNAL HOME HEALTH (OUTPATIENT)
Dept: HOME HEALTH SERVICES | Facility: HOSPITAL | Age: 68
End: 2023-08-14
Payer: MEDICARE

## 2023-08-31 ENCOUNTER — EXTERNAL CHRONIC CARE MANAGEMENT (OUTPATIENT)
Dept: FAMILY MEDICINE | Facility: CLINIC | Age: 68
End: 2023-08-31
Payer: MEDICARE

## 2023-08-31 PROCEDURE — G0511 PR CHRONIC CARE MGMT, RHC OR FQHC ONLY, 20 MINS OR MORE: ICD-10-PCS | Mod: ,,, | Performed by: REGISTERED NURSE

## 2023-08-31 PROCEDURE — G0511 CCM/BHI BY RHC/FQHC 20MIN MO: HCPCS | Mod: ,,, | Performed by: REGISTERED NURSE

## 2023-09-30 ENCOUNTER — EXTERNAL CHRONIC CARE MANAGEMENT (OUTPATIENT)
Dept: FAMILY MEDICINE | Facility: CLINIC | Age: 68
End: 2023-09-30
Payer: MEDICARE

## 2023-09-30 PROCEDURE — G0511 CCM/BHI BY RHC/FQHC 20MIN MO: HCPCS | Mod: ,,, | Performed by: REGISTERED NURSE

## 2023-09-30 PROCEDURE — G0511 PR CHRONIC CARE MGMT, RHC OR FQHC ONLY, 20 MINS OR MORE: ICD-10-PCS | Mod: ,,, | Performed by: REGISTERED NURSE

## 2023-10-16 ENCOUNTER — EXTERNAL HOME HEALTH (OUTPATIENT)
Dept: HOME HEALTH SERVICES | Facility: HOSPITAL | Age: 68
End: 2023-10-16
Payer: MEDICARE

## 2023-10-23 ENCOUNTER — OFFICE VISIT (OUTPATIENT)
Dept: FAMILY MEDICINE | Facility: CLINIC | Age: 68
End: 2023-10-23
Payer: MEDICARE

## 2023-10-23 VITALS
HEART RATE: 56 BPM | DIASTOLIC BLOOD PRESSURE: 69 MMHG | WEIGHT: 219 LBS | OXYGEN SATURATION: 96 % | HEIGHT: 70 IN | TEMPERATURE: 98 F | BODY MASS INDEX: 31.35 KG/M2 | SYSTOLIC BLOOD PRESSURE: 118 MMHG

## 2023-10-23 DIAGNOSIS — R42 DIZZINESS: Primary | ICD-10-CM

## 2023-10-23 DIAGNOSIS — I10 ESSENTIAL HYPERTENSION: ICD-10-CM

## 2023-10-23 DIAGNOSIS — Z23 NEED FOR VACCINATION: ICD-10-CM

## 2023-10-23 DIAGNOSIS — E11.9 TYPE 2 DIABETES MELLITUS WITHOUT COMPLICATION, WITH LONG-TERM CURRENT USE OF INSULIN: ICD-10-CM

## 2023-10-23 DIAGNOSIS — R27.0 ATAXIA: ICD-10-CM

## 2023-10-23 DIAGNOSIS — R30.0 DYSURIA: ICD-10-CM

## 2023-10-23 DIAGNOSIS — Z79.4 TYPE 2 DIABETES MELLITUS WITHOUT COMPLICATION, WITH LONG-TERM CURRENT USE OF INSULIN: ICD-10-CM

## 2023-10-23 PROBLEM — M25.561 ARTHRALGIA OF RIGHT KNEE: Status: ACTIVE | Noted: 2021-11-23

## 2023-10-23 LAB
BASOPHILS # BLD AUTO: 0.05 K/UL (ref 0–0.2)
BASOPHILS NFR BLD AUTO: 0.5 % (ref 0–1)
BILIRUB SERPL-MCNC: ABNORMAL MG/DL
BLOOD URINE, POC: ABNORMAL
COLOR, POC UA: YELLOW
DIFFERENTIAL METHOD BLD: ABNORMAL
EOSINOPHIL # BLD AUTO: 0.2 K/UL (ref 0–0.5)
EOSINOPHIL NFR BLD AUTO: 2.1 % (ref 1–4)
ERYTHROCYTE [DISTWIDTH] IN BLOOD BY AUTOMATED COUNT: 14.6 % (ref 11.5–14.5)
GLUCOSE SERPL-MCNC: 113 MG/DL (ref 70–110)
GLUCOSE UR QL STRIP: ABNORMAL
HCT VFR BLD AUTO: 38.4 % (ref 38–47)
HGB BLD-MCNC: 12.3 G/DL (ref 12–16)
IMM GRANULOCYTES # BLD AUTO: 0.04 K/UL (ref 0–0.04)
IMM GRANULOCYTES NFR BLD: 0.4 % (ref 0–0.4)
IMM RETICS NFR: 15.9 % (ref 3–15.9)
KETONES UR QL STRIP: ABNORMAL
LEUKOCYTE ESTERASE URINE, POC: ABNORMAL
LYMPHOCYTES # BLD AUTO: 3.71 K/UL (ref 1–4.8)
LYMPHOCYTES NFR BLD AUTO: 39 % (ref 27–41)
MCH RBC QN AUTO: 27.3 PG (ref 27–31)
MCHC RBC AUTO-ENTMCNC: 32 G/DL (ref 32–36)
MCV RBC AUTO: 85.1 FL (ref 80–96)
MONOCYTES # BLD AUTO: 0.52 K/UL (ref 0–0.8)
MONOCYTES NFR BLD AUTO: 5.5 % (ref 2–6)
MPC BLD CALC-MCNC: 11.3 FL (ref 9.4–12.4)
NEUTROPHILS # BLD AUTO: 4.99 K/UL (ref 1.8–7.7)
NEUTROPHILS NFR BLD AUTO: 52.5 % (ref 53–65)
NITRITE, POC UA: ABNORMAL
NRBC # BLD AUTO: 0 X10E3/UL
NRBC, AUTO (.00): 0 %
PH, POC UA: 6
PLATELET # BLD AUTO: 270 K/UL (ref 150–400)
PROTEIN, POC: ABNORMAL
RBC # BLD AUTO: 4.51 M/UL (ref 4.2–5.4)
RETICS # AUTO: 0.07 X10E6/UL (ref 0.02–0.11)
RETICS/RBC NFR AUTO: 1.6 % (ref 0.4–2.2)
SPECIFIC GRAVITY, POC UA: 1.01
UROBILINOGEN, POC UA: 1
WBC # BLD AUTO: 9.51 K/UL (ref 4.5–11)

## 2023-10-23 PROCEDURE — 83550 IRON BINDING TEST: CPT | Mod: ,,, | Performed by: CLINICAL MEDICAL LABORATORY

## 2023-10-23 PROCEDURE — 3008F BODY MASS INDEX DOCD: CPT | Mod: ,,, | Performed by: REGISTERED NURSE

## 2023-10-23 PROCEDURE — 85025 CBC WITH DIFFERENTIAL: ICD-10-PCS | Mod: ,,, | Performed by: CLINICAL MEDICAL LABORATORY

## 2023-10-23 PROCEDURE — 90694 VACC AIIV4 NO PRSRV 0.5ML IM: CPT | Mod: ,,, | Performed by: REGISTERED NURSE

## 2023-10-23 PROCEDURE — 81003 URINALYSIS AUTO W/O SCOPE: CPT | Mod: RHCUB | Performed by: REGISTERED NURSE

## 2023-10-23 PROCEDURE — 3078F DIAST BP <80 MM HG: CPT | Mod: ,,, | Performed by: REGISTERED NURSE

## 2023-10-23 PROCEDURE — 3074F PR MOST RECENT SYSTOLIC BLOOD PRESSURE < 130 MM HG: ICD-10-PCS | Mod: ,,, | Performed by: REGISTERED NURSE

## 2023-10-23 PROCEDURE — 90694 FLU VACCINE - QUADRIVALENT - ADJUVANTED: ICD-10-PCS | Mod: ,,, | Performed by: REGISTERED NURSE

## 2023-10-23 PROCEDURE — 87086 CULTURE, URINE: ICD-10-PCS | Mod: ,,, | Performed by: CLINICAL MEDICAL LABORATORY

## 2023-10-23 PROCEDURE — 82728 FERRITIN: ICD-10-PCS | Mod: ,,, | Performed by: CLINICAL MEDICAL LABORATORY

## 2023-10-23 PROCEDURE — 3046F HEMOGLOBIN A1C LEVEL >9.0%: CPT | Mod: ,,, | Performed by: REGISTERED NURSE

## 2023-10-23 PROCEDURE — 99213 PR OFFICE/OUTPT VISIT, EST, LEVL III, 20-29 MIN: ICD-10-PCS | Mod: ,,, | Performed by: REGISTERED NURSE

## 2023-10-23 PROCEDURE — 4010F PR ACE/ARB THEARPY RXD/TAKEN: ICD-10-PCS | Mod: ,,, | Performed by: REGISTERED NURSE

## 2023-10-23 PROCEDURE — 80048 BASIC METABOLIC PNL TOTAL CA: CPT | Mod: ,,, | Performed by: CLINICAL MEDICAL LABORATORY

## 2023-10-23 PROCEDURE — 82728 ASSAY OF FERRITIN: CPT | Mod: ,,, | Performed by: CLINICAL MEDICAL LABORATORY

## 2023-10-23 PROCEDURE — 4010F ACE/ARB THERAPY RXD/TAKEN: CPT | Mod: ,,, | Performed by: REGISTERED NURSE

## 2023-10-23 PROCEDURE — 3078F PR MOST RECENT DIASTOLIC BLOOD PRESSURE < 80 MM HG: ICD-10-PCS | Mod: ,,, | Performed by: REGISTERED NURSE

## 2023-10-23 PROCEDURE — 83540 IRON AND TIBC: ICD-10-PCS | Mod: ,,, | Performed by: CLINICAL MEDICAL LABORATORY

## 2023-10-23 PROCEDURE — 2023F DILAT RTA XM W/O RTNOPTHY: CPT | Mod: ,,, | Performed by: REGISTERED NURSE

## 2023-10-23 PROCEDURE — 3008F PR BODY MASS INDEX (BMI) DOCUMENTED: ICD-10-PCS | Mod: ,,, | Performed by: REGISTERED NURSE

## 2023-10-23 PROCEDURE — 85025 COMPLETE CBC W/AUTO DIFF WBC: CPT | Mod: ,,, | Performed by: CLINICAL MEDICAL LABORATORY

## 2023-10-23 PROCEDURE — 80048 BASIC METABOLIC PANEL: ICD-10-PCS | Mod: ,,, | Performed by: CLINICAL MEDICAL LABORATORY

## 2023-10-23 PROCEDURE — G0008 FLU VACCINE - QUADRIVALENT - ADJUVANTED: ICD-10-PCS | Mod: ,,, | Performed by: REGISTERED NURSE

## 2023-10-23 PROCEDURE — 99213 OFFICE O/P EST LOW 20 MIN: CPT | Mod: ,,, | Performed by: REGISTERED NURSE

## 2023-10-23 PROCEDURE — 85045 RETICULOCYTES: ICD-10-PCS | Mod: ,,, | Performed by: CLINICAL MEDICAL LABORATORY

## 2023-10-23 PROCEDURE — 3074F SYST BP LT 130 MM HG: CPT | Mod: ,,, | Performed by: REGISTERED NURSE

## 2023-10-23 PROCEDURE — 3046F PR MOST RECENT HEMOGLOBIN A1C LEVEL > 9.0%: ICD-10-PCS | Mod: ,,, | Performed by: REGISTERED NURSE

## 2023-10-23 PROCEDURE — 82607 VITAMIN B12: ICD-10-PCS | Mod: ,,, | Performed by: CLINICAL MEDICAL LABORATORY

## 2023-10-23 PROCEDURE — 2023F PR DILATED RETINAL EXAM W/O EVID OF RETINOPATHY: ICD-10-PCS | Mod: ,,, | Performed by: REGISTERED NURSE

## 2023-10-23 PROCEDURE — G0008 ADMIN INFLUENZA VIRUS VAC: HCPCS | Mod: ,,, | Performed by: REGISTERED NURSE

## 2023-10-23 PROCEDURE — 85045 AUTOMATED RETICULOCYTE COUNT: CPT | Mod: ,,, | Performed by: CLINICAL MEDICAL LABORATORY

## 2023-10-23 PROCEDURE — 82962 GLUCOSE BLOOD TEST: CPT | Mod: RHCUB | Performed by: REGISTERED NURSE

## 2023-10-23 PROCEDURE — 83550 IRON AND TIBC: ICD-10-PCS | Mod: ,,, | Performed by: CLINICAL MEDICAL LABORATORY

## 2023-10-23 PROCEDURE — 87086 URINE CULTURE/COLONY COUNT: CPT | Mod: ,,, | Performed by: CLINICAL MEDICAL LABORATORY

## 2023-10-23 PROCEDURE — 83540 ASSAY OF IRON: CPT | Mod: ,,, | Performed by: CLINICAL MEDICAL LABORATORY

## 2023-10-23 PROCEDURE — 82607 VITAMIN B-12: CPT | Mod: ,,, | Performed by: CLINICAL MEDICAL LABORATORY

## 2023-10-23 RX ORDER — ESCITALOPRAM OXALATE 20 MG/1
20 TABLET ORAL DAILY
COMMUNITY
Start: 2023-08-14 | End: 2023-11-10

## 2023-10-23 RX ORDER — INSULIN DETEMIR 100 [IU]/ML
30 INJECTION, SOLUTION SUBCUTANEOUS NIGHTLY
Qty: 27 ML | Refills: 1 | Status: SHIPPED | OUTPATIENT
Start: 2023-10-23 | End: 2023-11-06

## 2023-10-23 RX ORDER — ALBUTEROL SULFATE 0.83 MG/ML
SOLUTION RESPIRATORY (INHALATION)
COMMUNITY
Start: 2023-05-26

## 2023-10-23 RX ORDER — VALSARTAN 160 MG/1
160 TABLET ORAL DAILY
Qty: 90 TABLET | Refills: 1 | Status: SHIPPED | OUTPATIENT
Start: 2023-10-23 | End: 2024-04-20

## 2023-10-23 NOTE — PATIENT INSTRUCTIONS
Hold Metoprolol. The home health nurse will be at your home tomorrow to review over all medications and be sure you are taking all correctly.     Return for next appointment in 1-2 weeks, sooner if needed. Take all medications as prescribed. Do not stop or start any new medications without consulting with your provider. If you have access to blood pressure monitor at home, may check blood pressure as needed if symptoms of high or low blood pressure evident.

## 2023-10-23 NOTE — PROGRESS NOTES
KENZIE Mercado        PATIENT NAME: Estefania Urias  : 1955  DATE: 10/23/23  MRN: [id number]      Billing Provider: KENZIE Mercado  Level of Service: [place] OFFICE/OUTPT VISIT, [facility], [facility] III, 20-29 MIN  Patient PCP Information       Provider PCP Type    KENIZE Mercado General            Reason for Visit / Chief Complaint: Dizziness and Shortness of Breath (Started last week; worse when trying to stand up or walk around)       Update PCP  Update Chief Complaint         History of Present Illness / Problem Focused Workflow     Dizziness:   Chronicity:  New  Onset:  1 to 4 weeks ago  Progression since onset:  Waxing and waning  Frequency:  Every few hours  Duration:  1 minute  Dizziness characteristics:  Spacial disorientation, sensation of movement, off-balance, giddiness, lightheaded/impending faint and walking on uneven surface  Frequency of Spells:  Daily   Associated symptoms: weakness and light-headedness.no hearing loss, no ear congestion, no ear pain, no fever, no headaches, no tinnitus, no nausea, no vomiting, no diaphoresis, no aural fullness, no visual disturbances, no syncope, no palpitations, no panic, no facial weakness, no slurred speech, no numbness in extremities and no chest pain.  Aggravated by:  Position changes, getting up, bending and exertion  Risk factors: History of Cardiac disease, medications for treatment of Cardiac conditions. Pulse rate.    Review of Systems     Review of Systems   Constitutional:  Negative for diaphoresis and fever.   HENT:  Negative for ear pain, hearing loss and tinnitus.    Cardiovascular:  Negative for chest pain, palpitations and syncope.   Gastrointestinal:  Negative for nausea and vomiting.   Neurological:  Positive for dizziness, weakness and light-headedness. Negative for headaches.      Medical / Social / Family History     Past Medical History:   Diagnosis Date    Hyperlipidemia     Thyroid nodule 2023     Past Surgical History:  "  Procedure Laterality Date    HYSTERECTOMY       Social History  Ms. Estefania Urias  reports that she has never smoked. She has been exposed to tobacco smoke. She has never used smokeless tobacco. She reports that she does not drink alcohol and does not use drugs.    Family History  Ms. Estefania Urias's family history includes Diabetes in her mother; Heart disease in her father.    Medications and Allergies     Medications  Outpatient Medications Marked as Taking for the 10/23/23 encounter (Office Visit) with Donis Perales FNP   Medication Sig Dispense Refill    albuterol (PROVENTIL) 2.5 mg /3 mL (0.083 %) nebulizer solution SMARTSIG:3 Milliliter(s) Via Nebulizer Every 6 Hours PRN      aspirin (ECOTRIN) 81 MG EC tablet Take 81 mg by mouth once daily.      atorvastatin (LIPITOR) 40 MG tablet Take 1 tablet (40 mg total) by mouth once daily. 90 tablet 1    COMBIGAN 0.2-0.5 % Drop Place 1 drop into both eyes 2 (two) times daily.      EScitalopram oxalate (LEXAPRO) 20 MG tablet Take 20 mg by mouth Daily.      [] FARXIGA 10 mg tablet Take 1 tablet (10 mg total) by mouth once daily. 90 tablet 1    glipizide-metformin (METAGLIP) 5-500 mg per tablet Take 2 tablets by mouth 2 (two) times daily before meals. 360 tablet 1    latanoprost 0.005 % ophthalmic solution Place 1 drop into both eyes nightly.      metoprolol succinate (TOPROL-XL) 25 MG 24 hr tablet Take 1 tablet (25 mg total) by mouth once daily. 90 tablet 1    pen needle, diabetic (ULTICARE PEN NEEDLE) 32 gauge x 5/32" Ndle USE with levemir AS DIRECTED 100 each 2    [DISCONTINUED] esomeprazole (NEXIUM) 40 MG capsule Take 1 capsule (40 mg total) by mouth once daily. 90 capsule 1    [DISCONTINUED] hydrALAZINE (APRESOLINE) 25 MG tablet Take 1 tablet (25 mg total) by mouth 3 (three) times daily. 270 tablet 1    [DISCONTINUED] levothyroxine (SYNTHROID) 75 MCG tablet Take 1 tablet (75 mcg total) by mouth every morning. 90 tablet 1     Current " Facility-Administered Medications for the 10/23/23 encounter (Office Visit) with Donis Perales FNP   Medication Dose Route Frequency Provider Last Rate Last Admin    cyanocobalamin injection 1,000 mcg  1,000 mcg Intramuscular 1 time in Clinic/HOD Donis Perales FNP         Allergies  Review of patient's allergies indicates:  No Known Allergies    Physical Examination     Vitals:    10/23/23 1450   BP: 118/69   Pulse: (!) 56   Temp: 97.8 °F (36.6 °C)     Physical Exam     Assessment and Plan (including Health Maintenance)      Problem List  Smart Sets  Document Outside HM   Plan:   Dizziness  -     POCT URINALYSIS W/O SCOPE  -     POCT Glucose, Hand-Held Device  -     Urine culture; Future; Expected date: 10/23/2023  -     Ferritin; Future; Expected date: 10/23/2023  -     Iron and TIBC; Future; Expected date: 10/23/2023  -     Vitamin B12; Future; Expected date: 10/23/2023    Type 2 diabetes mellitus without complication, with long-term current use of insulin  -     POCT Glucose, Hand-Held Device  -     Discontinue: insulin detemir U-100, Levemir, (LEVEMIR FLEXTOUCH U100 INSULIN) 100 unit/mL (3 mL) InPn pen; Inject 30 Units into the skin every evening.  Dispense: 27 mL; Refill: 1  -     Basic Metabolic Panel; Future; Expected date: 10/23/2023  -     CBC Auto Differential; Future; Expected date: 10/23/2023  -     Ferritin; Future; Expected date: 10/23/2023  -     Iron and TIBC; Future; Expected date: 10/23/2023  -     Reticulocytes; Future; Expected date: 10/23/2023    Essential hypertension  -     valsartan (DIOVAN) 160 MG tablet; Take 1 tablet (160 mg total) by mouth once daily.  Dispense: 90 tablet; Refill: 1  -     hydrALAZINE (APRESOLINE) 25 MG tablet; Take 1 tablet (25 mg total) by mouth 3 (three) times daily.  Dispense: 270 tablet; Refill: 1    Ataxia  -     Vitamin B12; Future; Expected date: 10/23/2023    Dysuria  -     Urine culture; Future; Expected date: 10/23/2023    Need for vaccination  -      Influenza (FLUAD) - Quadrivalent (Adjuvanted) *Preferred* (65+) (PF)    Other orders  -     ferrous sulfate (FEOSOL) 325 mg (65 mg iron) Tab tablet; Take 1 tablet (325 mg total) by mouth once daily.  Dispense: 90 tablet; Refill: 0    Mrs. Urias has not been to follow-up with Cardiologist in the past 1-2 years. It is unclear when she last saw a Cardiologist or if she has seen one. She has history of Hypertension, has been taking Metoprolol and Valsartan. Was previously seen by Dr. Hughes for renal disease. Continues taking Hydralazine as ordered in 2020. She has not been back for follow-up per onset of COVID in 2020. Will review lab work and update plan of care as indicated. She is not due for A1C until November, last results were highest this year at 9.3. Encouraged close monitoring of blood sugars. Will send order for continuous glucose monitoring due to use of long acting insulin and uncontrolled condition.     Health Maintenance Due   Topic Date Due    Pneumococcal Vaccines (Age 65+) (1 - PCV) Never done    Foot Exam  Never done    HIV Screening  Never done    Mammogram  Never done    DEXA Scan  Never done    Colorectal Cancer Screening  Never done    Shingles Vaccine (1 of 2) Never done    RSV Vaccine (Age 60+) (1 - 1-dose 60+ series) Never done    TETANUS VACCINE  07/15/2020    COVID-19 Vaccine (5 - 2023-24 season) 09/01/2023    Hemoglobin A1c  11/01/2023       Problem List Items Addressed This Visit          Cardiac/Vascular    Essential hypertension    Relevant Medications    valsartan (DIOVAN) 160 MG tablet    hydrALAZINE (APRESOLINE) 25 MG tablet       Endocrine    Type 2 diabetes mellitus without complication, with long-term current use of insulin    Relevant Orders    POCT Glucose, Hand-Held Device (Completed)    Basic Metabolic Panel (Completed)    CBC Auto Differential (Completed)    Ferritin (Completed)    Iron and TIBC (Completed)    Reticulocytes (Completed)     Other Visit Diagnoses        Dizziness    -  Primary    Relevant Orders    POCT URINALYSIS W/O SCOPE (Completed)    POCT Glucose, Hand-Held Device (Completed)    Urine culture (Completed)    Ferritin (Completed)    Iron and TIBC (Completed)    Vitamin B12 (Completed)    Ataxia        Relevant Orders    Vitamin B12 (Completed)    Dysuria        Relevant Orders    Urine culture (Completed)    Need for vaccination        Relevant Orders    Influenza (FLUAD) - Quadrivalent (Adjuvanted) *Preferred* (65+) (PF) (Completed)          Health Maintenance Topics with due status: Not Due       Topic Last Completion Date    Eye Exam 04/27/2023    Diabetes Urine Screening 08/01/2023    Lipid Panel 08/01/2023    Low Dose Statin 10/23/2023     No future appointments.     Patient Instructions   Hold Metoprolol. The home health nurse will be at your home tomorrow to review over all medications and be sure you are taking all correctly.     Return for next appointment in 1-2 weeks, sooner if needed. Take all medications as prescribed. Do not stop or start any new medications without consulting with your provider. If you have access to blood pressure monitor at home, may check blood pressure as needed if symptoms of high or low blood pressure evident.        Follow up in about 2 weeks (around 11/6/2023) for HTN.     Signature:  KENZIE Mercado      Date of encounter: 10/23/23

## 2023-10-24 LAB
ANION GAP SERPL CALCULATED.3IONS-SCNC: 5 MMOL/L (ref 7–16)
BUN SERPL-MCNC: 11 MG/DL (ref 7–18)
BUN/CREAT SERPL: 13 (ref 6–20)
CALCIUM SERPL-MCNC: 8.9 MG/DL (ref 8.5–10.1)
CHLORIDE SERPL-SCNC: 108 MMOL/L (ref 98–107)
CO2 SERPL-SCNC: 30 MMOL/L (ref 21–32)
CREAT SERPL-MCNC: 0.88 MG/DL (ref 0.55–1.02)
EGFR (NO RACE VARIABLE) (RUSH/TITUS): 73 ML/MIN/1.73M2
FERRITIN SERPL-MCNC: 20 NG/ML (ref 8–252)
GLUCOSE SERPL-MCNC: 116 MG/DL (ref 74–106)
IRON SATN MFR SERPL: 13 % (ref 14–50)
IRON SERPL-MCNC: 36 ΜG/DL (ref 50–170)
POTASSIUM SERPL-SCNC: 4.4 MMOL/L (ref 3.5–5.1)
SODIUM SERPL-SCNC: 139 MMOL/L (ref 136–145)
TIBC SERPL-MCNC: 276 ΜG/DL (ref 250–450)
VIT B12 SERPL-MCNC: 282 PG/ML (ref 193–986)

## 2023-10-25 LAB — UA COMPLETE W REFLEX CULTURE PNL UR: NORMAL

## 2023-10-31 ENCOUNTER — EXTERNAL CHRONIC CARE MANAGEMENT (OUTPATIENT)
Dept: FAMILY MEDICINE | Facility: CLINIC | Age: 68
End: 2023-10-31
Payer: MEDICARE

## 2023-10-31 PROCEDURE — G0511 CCM/BHI BY RHC/FQHC 20MIN MO: HCPCS | Mod: ,,, | Performed by: REGISTERED NURSE

## 2023-10-31 PROCEDURE — G0511 PR CHRONIC CARE MGMT, RHC OR FQHC ONLY, 20 MINS OR MORE: ICD-10-PCS | Mod: ,,, | Performed by: REGISTERED NURSE

## 2023-11-01 ENCOUNTER — DOCUMENT SCAN (OUTPATIENT)
Dept: HOME HEALTH SERVICES | Facility: HOSPITAL | Age: 68
End: 2023-11-01
Payer: MEDICARE

## 2023-11-06 ENCOUNTER — OFFICE VISIT (OUTPATIENT)
Dept: FAMILY MEDICINE | Facility: CLINIC | Age: 68
End: 2023-11-06
Payer: MEDICARE

## 2023-11-06 VITALS
HEIGHT: 70 IN | DIASTOLIC BLOOD PRESSURE: 77 MMHG | WEIGHT: 221.19 LBS | TEMPERATURE: 98 F | OXYGEN SATURATION: 97 % | SYSTOLIC BLOOD PRESSURE: 137 MMHG | HEART RATE: 52 BPM | BODY MASS INDEX: 31.67 KG/M2

## 2023-11-06 DIAGNOSIS — E03.9 HYPOTHYROIDISM, UNSPECIFIED TYPE: ICD-10-CM

## 2023-11-06 DIAGNOSIS — D64.9 ANEMIA, UNSPECIFIED TYPE: ICD-10-CM

## 2023-11-06 DIAGNOSIS — Z79.4 TYPE 2 DIABETES MELLITUS WITHOUT COMPLICATION, WITH LONG-TERM CURRENT USE OF INSULIN: Primary | ICD-10-CM

## 2023-11-06 DIAGNOSIS — K21.9 GASTROESOPHAGEAL REFLUX DISEASE, UNSPECIFIED WHETHER ESOPHAGITIS PRESENT: ICD-10-CM

## 2023-11-06 DIAGNOSIS — E11.9 TYPE 2 DIABETES MELLITUS WITHOUT COMPLICATION, WITH LONG-TERM CURRENT USE OF INSULIN: Primary | ICD-10-CM

## 2023-11-06 LAB
EST. AVERAGE GLUCOSE BLD GHB EST-MCNC: 187 MG/DL
HBA1C MFR BLD HPLC: 8.2 % (ref 4.5–6.6)

## 2023-11-06 PROCEDURE — 4010F ACE/ARB THERAPY RXD/TAKEN: CPT | Mod: ,,, | Performed by: REGISTERED NURSE

## 2023-11-06 PROCEDURE — 96372 PR INJECTION,THERAP/PROPH/DIAG2ST, IM OR SUBCUT: ICD-10-PCS | Mod: ,,, | Performed by: REGISTERED NURSE

## 2023-11-06 PROCEDURE — 83036 HEMOGLOBIN GLYCOSYLATED A1C: CPT | Mod: ,,, | Performed by: CLINICAL MEDICAL LABORATORY

## 2023-11-06 PROCEDURE — 3078F DIAST BP <80 MM HG: CPT | Mod: ,,, | Performed by: REGISTERED NURSE

## 2023-11-06 PROCEDURE — 3075F PR MOST RECENT SYSTOLIC BLOOD PRESS GE 130-139MM HG: ICD-10-PCS | Mod: ,,, | Performed by: REGISTERED NURSE

## 2023-11-06 PROCEDURE — 3046F PR MOST RECENT HEMOGLOBIN A1C LEVEL > 9.0%: ICD-10-PCS | Mod: ,,, | Performed by: REGISTERED NURSE

## 2023-11-06 PROCEDURE — 3008F BODY MASS INDEX DOCD: CPT | Mod: ,,, | Performed by: REGISTERED NURSE

## 2023-11-06 PROCEDURE — 99213 OFFICE O/P EST LOW 20 MIN: CPT | Mod: 25,,, | Performed by: REGISTERED NURSE

## 2023-11-06 PROCEDURE — 2023F PR DILATED RETINAL EXAM W/O EVID OF RETINOPATHY: ICD-10-PCS | Mod: ,,, | Performed by: REGISTERED NURSE

## 2023-11-06 PROCEDURE — 99213 PR OFFICE/OUTPT VISIT, EST, LEVL III, 20-29 MIN: ICD-10-PCS | Mod: 25,,, | Performed by: REGISTERED NURSE

## 2023-11-06 PROCEDURE — 3075F SYST BP GE 130 - 139MM HG: CPT | Mod: ,,, | Performed by: REGISTERED NURSE

## 2023-11-06 PROCEDURE — 4010F PR ACE/ARB THEARPY RXD/TAKEN: ICD-10-PCS | Mod: ,,, | Performed by: REGISTERED NURSE

## 2023-11-06 PROCEDURE — 3078F PR MOST RECENT DIASTOLIC BLOOD PRESSURE < 80 MM HG: ICD-10-PCS | Mod: ,,, | Performed by: REGISTERED NURSE

## 2023-11-06 PROCEDURE — 3008F PR BODY MASS INDEX (BMI) DOCUMENTED: ICD-10-PCS | Mod: ,,, | Performed by: REGISTERED NURSE

## 2023-11-06 PROCEDURE — 96372 THER/PROPH/DIAG INJ SC/IM: CPT | Mod: ,,, | Performed by: REGISTERED NURSE

## 2023-11-06 PROCEDURE — 1160F RVW MEDS BY RX/DR IN RCRD: CPT | Mod: ,,, | Performed by: REGISTERED NURSE

## 2023-11-06 PROCEDURE — 1159F MED LIST DOCD IN RCRD: CPT | Mod: ,,, | Performed by: REGISTERED NURSE

## 2023-11-06 PROCEDURE — 1160F PR REVIEW ALL MEDS BY PRESCRIBER/CLIN PHARMACIST DOCUMENTED: ICD-10-PCS | Mod: ,,, | Performed by: REGISTERED NURSE

## 2023-11-06 PROCEDURE — 1159F PR MEDICATION LIST DOCUMENTED IN MEDICAL RECORD: ICD-10-PCS | Mod: ,,, | Performed by: REGISTERED NURSE

## 2023-11-06 PROCEDURE — 2023F DILAT RTA XM W/O RTNOPTHY: CPT | Mod: ,,, | Performed by: REGISTERED NURSE

## 2023-11-06 PROCEDURE — 83036 HEMOGLOBIN A1C: ICD-10-PCS | Mod: ,,, | Performed by: CLINICAL MEDICAL LABORATORY

## 2023-11-06 PROCEDURE — 3046F HEMOGLOBIN A1C LEVEL >9.0%: CPT | Mod: ,,, | Performed by: REGISTERED NURSE

## 2023-11-06 RX ORDER — FERROUS SULFATE 325(65) MG
325 TABLET ORAL DAILY
Qty: 90 TABLET | Refills: 0 | Status: SHIPPED | OUTPATIENT
Start: 2023-11-06 | End: 2024-02-04

## 2023-11-06 RX ORDER — ESOMEPRAZOLE MAGNESIUM 40 MG/1
40 CAPSULE, DELAYED RELEASE ORAL DAILY
Qty: 90 CAPSULE | Refills: 1 | Status: SHIPPED | OUTPATIENT
Start: 2023-11-06 | End: 2024-05-04

## 2023-11-06 RX ORDER — PEN NEEDLE, DIABETIC 30 GX3/16"
1 NEEDLE, DISPOSABLE MISCELLANEOUS DAILY
Qty: 100 EACH | Refills: 2 | Status: SHIPPED | OUTPATIENT
Start: 2023-11-06 | End: 2024-11-05

## 2023-11-06 RX ORDER — INSULIN DETEMIR 100 [IU]/ML
20 INJECTION, SOLUTION SUBCUTANEOUS DAILY
COMMUNITY

## 2023-11-06 RX ORDER — HYDRALAZINE HYDROCHLORIDE 25 MG/1
25 TABLET, FILM COATED ORAL 3 TIMES DAILY
Qty: 270 TABLET | Refills: 1 | Status: SHIPPED | OUTPATIENT
Start: 2023-11-06 | End: 2024-05-04

## 2023-11-06 RX ORDER — CYANOCOBALAMIN 1000 UG/ML
1000 INJECTION, SOLUTION INTRAMUSCULAR; SUBCUTANEOUS
Status: COMPLETED | OUTPATIENT
Start: 2023-11-06 | End: 2023-11-06

## 2023-11-06 RX ORDER — LEVOTHYROXINE SODIUM 75 UG/1
75 TABLET ORAL EVERY MORNING
Qty: 90 TABLET | Refills: 1 | Status: SHIPPED | OUTPATIENT
Start: 2023-11-06 | End: 2024-05-04

## 2023-11-06 RX ADMIN — CYANOCOBALAMIN 1000 MCG: 1000 INJECTION, SOLUTION INTRAMUSCULAR; SUBCUTANEOUS at 03:11

## 2023-11-06 NOTE — PROGRESS NOTES
KENZIE Mercado        PATIENT NAME: Estefania Urias  : 1955  DATE: 23  MRN: [id number]      Billing Provider: KENZIE Mercado  Level of Service: [place] OFFICE/OUTPT VISIT, EST, [facility] III, 20-29 MIN  Patient PCP Information       Provider PCP Type    KENZIE Mercado General            Reason for Visit / Chief Complaint: Follow-up (2 week) and Dizziness (States it's a little better but still having problems)       Update PCP  Update Chief Complaint         History of Present Illness / Problem Focused Workflow      Follow-up  Associated symptoms include arthralgias, fatigue, myalgias and vertigo. Pertinent negatives include no chest pain, coughing, headaches, numbness or weakness.   Dizziness:    Associated symptoms: light-headedness.no headaches, no weakness and no chest pain.   Mrs. Urias is a 67-year-old AAF here for 2 week follow-up post complaints of dizziness. She has been holding the Metoprolol, [facility] who provides patient with skilled nursing services. She indicates symptoms have improved slightly. She continues to complain of dizziness upon standing after she has been sitting or lying. She denies syncope or near syncope, states episodes last for no more than a minute, occur frequently throughout the day. No chest pain, no shortness of breath, no reports of palpitations. Lab work indicates iron deficient anemia. B12 level on lower side of normal.     Review of Systems     Review of Systems   Constitutional:  Positive for activity change and fatigue.   HENT: Negative.     Respiratory:  Positive for shortness of breath. Negative for apnea, cough, chest tightness and wheezing.    Cardiovascular:  Negative for chest pain and leg swelling.   Gastrointestinal: Negative.    Genitourinary: Negative.    Musculoskeletal:  Positive for arthralgias, back pain, leg pain and myalgias.   Neurological:  Positive for dizziness, vertigo and light-headedness.  Negative for tremors, seizures, syncope, speech difficulty, weakness, numbness, headaches and memory loss.   Psychiatric/Behavioral:  Positive for sleep disturbance.         Medical / Social / Family History     Past Medical History:   Diagnosis Date    Hyperlipidemia     Thyroid nodule 8/1/2023     Past Surgical History:   Procedure Laterality Date    HYSTERECTOMY       Social History  Ms. Estefania Urias  reports that she has never smoked. She has been exposed to tobacco smoke. She has never used smokeless tobacco. She reports that she does not drink alcohol and does not use drugs.    Family History  Ms. Estefania Urias's family history includes Diabetes in her mother; Heart disease in her father.    Medications and Allergies     Medications  Outpatient Medications Marked as Taking for the 11/6/23 encounter (Office Visit) with Donis Perales FNP   Medication Sig Dispense Refill    albuterol (PROVENTIL) 2.5 mg /3 mL (0.083 %) nebulizer solution SMARTSIG:3 Milliliter(s) Via Nebulizer Every 6 Hours PRN      albuterol (VENTOLIN HFA) 90 mcg/actuation inhaler Inhale 2 puffs into the lungs every 6 (six) hours as needed for Wheezing. Rescue 18 g 0    aspirin (ECOTRIN) 81 MG EC tablet Take 81 mg by mouth once daily.      atorvastatin (LIPITOR) 40 MG tablet Take 1 tablet (40 mg total) by mouth once daily. 90 tablet 1    COMBIGAN 0.2-0.5 % Drop Place 1 drop into both eyes 2 (two) times daily.      EScitalopram oxalate (LEXAPRO) 20 MG tablet Take 20 mg by mouth Daily.      glipizide-metformin (METAGLIP) 5-500 mg per tablet Take 2 tablets by mouth 2 (two) times daily before meals. 360 tablet 1    insulin detemir U-100, Levemir, (LEVEMIR FLEXPEN) 100 unit/mL (3 mL) InPn pen Inject 20 Units into the skin once daily.      latanoprost 0.005 % ophthalmic solution Place 1 drop into both eyes nightly.      metoprolol succinate (TOPROL-XL) 25 MG 24 hr tablet Take 1 tablet (25 mg total) by mouth once daily. 90 tablet 1     "valsartan (DIOVAN) 160 MG tablet Take 1 tablet (160 mg total) by mouth once daily. 90 tablet 1    [DISCONTINUED] pen needle, diabetic (ULTICARE PEN NEEDLE) 32 gauge x 5/32" Ndle USE with levemir AS DIRECTED 100 each 2     Current Facility-Administered Medications for the 11/6/23 encounter (Office Visit) with Donis Perales FNP   Medication Dose Route Frequency Provider Last Rate Last Admin    [COMPLETED] cyanocobalamin injection 1,000 mcg  1,000 mcg Intramuscular 1 time in Clinic/HOD Donis Perales FNP   1,000 mcg at 11/06/23 1540     Allergies  Review of patient's allergies indicates:  No Known Allergies    Physical Examination     Vitals:    11/06/23 1501   BP: 137/77   Pulse: (!) 52   Temp: 98.4 °F (36.9 °C)     Physical Exam  Vitals and nursing note reviewed.   Constitutional:       Appearance: Normal appearance. She is obese.   HENT:      Head: Normocephalic and atraumatic.      Nose: Nose normal.   Cardiovascular:      Rate and Rhythm: Normal rate and regular rhythm.      Heart sounds: Normal heart sounds.   Pulmonary:      Effort: Pulmonary effort is normal.      Breath sounds: Normal breath sounds.   Skin:     General: Skin is warm and dry.   Neurological:      Mental Status: She is alert. Mental status is at baseline.          Assessment and Plan (including Health Maintenance)      Problem List  Smart Sets  Document Outside HM   Plan:   Type 2 diabetes mellitus without complication, with long-term current use of insulin  -     Hemoglobin A1C; Future; Expected date: 11/06/2023  -     pen needle, diabetic (ULTICARE PEN NEEDLE) 32 gauge x 5/32" Ndle; 1 each by Misc.(Non-Drug; Combo Route) route once daily.  Dispense: 100 each; Refill: 2    Hypothyroidism, unspecified type  -     levothyroxine (SYNTHROID) 75 MCG tablet; Take 1 tablet (75 mcg total) by mouth every morning.  Dispense: 90 tablet; Refill: 1    Gastroesophageal reflux disease, unspecified whether esophagitis present  -     esomeprazole " "(NEXIUM) 40 MG capsule; Take 1 capsule (40 mg total) by mouth once daily.  Dispense: 90 capsule; Refill: 1    Anemia, unspecified type  Comments:  Start Ferrous Sulfate 325mg, 1 tablet once per day for treatment of anemia.   Orders:  -     cyanocobalamin injection 1,000 mcg    Continue to hold Metoprolol. Follow-up with PCP at new location in 4 weeks.     Health Maintenance Due   Topic Date Due    Pneumococcal Vaccines (Age 65+) (1 - PCV) Never done    Foot Exam  Never done    HIV Screening  Never done    Mammogram  Never done    DEXA Scan  Never done    Colorectal Cancer Screening  Never done    Shingles Vaccine (1 of 2) Never done    RSV Vaccine (Age 60+) (1 - 1-dose 60+ series) Never done    TETANUS VACCINE  07/15/2020    COVID-19 Vaccine (5 - 2023-24 season) 09/01/2023    Hemoglobin A1c  11/01/2023     Problem List Items Addressed This Visit          Endocrine    Type 2 diabetes mellitus without complication, with long-term current use of insulin - Primary    Relevant Medications    insulin detemir U-100, Levemir, (LEVEMIR FLEXPEN) 100 unit/mL (3 mL) InPn pen    pen needle, diabetic (ULTICARE PEN NEEDLE) 32 gauge x 5/32" Ndle    Other Relevant Orders    Hemoglobin A1C     Other Visit Diagnoses       Hypothyroidism, unspecified type        Relevant Medications    levothyroxine (SYNTHROID) 75 MCG tablet    Gastroesophageal reflux disease, unspecified whether esophagitis present        Relevant Medications    esomeprazole (NEXIUM) 40 MG capsule    Anemia, unspecified type        Start Ferrous Sulfate 325mg, 1 tablet once per day for treatment of anemia.     Relevant Medications    cyanocobalamin injection 1,000 mcg (Completed)          Health Maintenance Topics with due status: Not Due       Topic Last Completion Date    Eye Exam 04/27/2023    Diabetes Urine Screening 08/01/2023    Lipid Panel 08/01/2023    Low Dose Statin 10/23/2023     No future appointments.     Patient Instructions   Someone will call you with " a cardiology appointment. I am referring you to Genesis Dixon DNP who comes from [facility] to [place].     I have reprinted your Mammogram order, someone from [facility] should call with appointment information.   Follow up in about 6 weeks (around 12/18/2023).     Signature:  KENZIE Mercado      Date of encounter: 11/6/23

## 2023-11-06 NOTE — PATIENT INSTRUCTIONS
Someone will call you with a cardiology appointment. I am referring you to Genesis Dixon DNP who comes from [facility] to [place] [facility].     I have reprinted your Mammogram order, someone from [place] should call with appointment information.

## 2023-11-10 RX ORDER — ESCITALOPRAM OXALATE 20 MG/1
20 TABLET ORAL
Qty: 90 TABLET | Refills: 1 | Status: SHIPPED | OUTPATIENT
Start: 2023-11-10

## 2023-11-17 ENCOUNTER — TELEPHONE (OUTPATIENT)
Dept: FAMILY MEDICINE | Facility: CLINIC | Age: 68
End: 2023-11-17
Payer: MEDICARE

## 2023-11-17 NOTE — TELEPHONE ENCOUNTER
----- Message from KENZIE Mercado sent at 11/16/2023 11:35 AM CST -----  Please let Mrs. Urias know her A1C did improve from 9.3 to 8.2 with the most recent check She needs to continue her current medication regimen. If she is planning to follow-up with me at the new location, she can return for follow-up in February, sooner if needed. If not, she needs to make plans to follow-up with a new provider here. I believe she still has home health, they can contact me if she has any problems over the next couple of weeks until I can get established at the new location.

## 2023-11-30 ENCOUNTER — EXTERNAL CHRONIC CARE MANAGEMENT (OUTPATIENT)
Dept: FAMILY MEDICINE | Facility: CLINIC | Age: 68
End: 2023-11-30
Payer: MEDICARE

## 2023-11-30 PROCEDURE — G0511 CCM/BHI BY RHC/FQHC 20MIN MO: HCPCS | Mod: ,,, | Performed by: REGISTERED NURSE

## 2023-11-30 PROCEDURE — G0511 PR CHRONIC CARE MGMT, RHC OR FQHC ONLY, 20 MINS OR MORE: ICD-10-PCS | Mod: ,,, | Performed by: REGISTERED NURSE

## 2023-12-31 ENCOUNTER — EXTERNAL CHRONIC CARE MANAGEMENT (OUTPATIENT)
Dept: FAMILY MEDICINE | Facility: CLINIC | Age: 68
End: 2023-12-31
Payer: MEDICARE

## 2024-01-31 ENCOUNTER — EXTERNAL CHRONIC CARE MANAGEMENT (OUTPATIENT)
Dept: FAMILY MEDICINE | Facility: CLINIC | Age: 69
End: 2024-01-31

## 2024-02-29 ENCOUNTER — EXTERNAL CHRONIC CARE MANAGEMENT (OUTPATIENT)
Dept: FAMILY MEDICINE | Facility: CLINIC | Age: 69
End: 2024-02-29
Payer: MEDICARE

## 2024-02-29 PROCEDURE — G0511 CCM/BHI BY RHC/FQHC 20MIN MO: HCPCS | Mod: ,,, | Performed by: STUDENT IN AN ORGANIZED HEALTH CARE EDUCATION/TRAINING PROGRAM

## 2024-04-02 ENCOUNTER — LAB REQUISITION (OUTPATIENT)
Dept: LAB | Facility: HOSPITAL | Age: 69
End: 2024-04-02
Attending: REGISTERED NURSE
Payer: MEDICARE

## 2024-04-02 DIAGNOSIS — E11.9 TYPE 2 DIABETES MELLITUS WITHOUT COMPLICATIONS: ICD-10-CM

## 2024-04-02 LAB
EST. AVERAGE GLUCOSE BLD GHB EST-MCNC: 209 MG/DL
HBA1C MFR BLD HPLC: 8.9 % (ref 4.5–6.6)

## 2024-04-02 PROCEDURE — 83036 HEMOGLOBIN GLYCOSYLATED A1C: CPT | Performed by: REGISTERED NURSE

## 2024-07-09 ENCOUNTER — LAB REQUISITION (OUTPATIENT)
Dept: LAB | Facility: HOSPITAL | Age: 69
End: 2024-07-09
Attending: REGISTERED NURSE
Payer: MEDICARE

## 2024-07-09 DIAGNOSIS — E11.9 TYPE 2 DIABETES MELLITUS WITHOUT COMPLICATIONS: ICD-10-CM

## 2024-07-09 DIAGNOSIS — E03.9 HYPOTHYROIDISM, UNSPECIFIED: ICD-10-CM

## 2024-07-09 DIAGNOSIS — E78.5 HYPERLIPIDEMIA, UNSPECIFIED: ICD-10-CM

## 2024-07-09 DIAGNOSIS — I10 ESSENTIAL (PRIMARY) HYPERTENSION: ICD-10-CM

## 2024-07-09 LAB
ALBUMIN SERPL BCP-MCNC: 3.2 G/DL (ref 3.5–5)
ALBUMIN/GLOB SERPL: 0.9 {RATIO}
ALP SERPL-CCNC: 83 U/L (ref 55–142)
ALT SERPL W P-5'-P-CCNC: 12 U/L (ref 13–56)
ANION GAP SERPL CALCULATED.3IONS-SCNC: 17 MMOL/L (ref 7–16)
AST SERPL W P-5'-P-CCNC: 13 U/L (ref 15–37)
BACTERIA #/AREA URNS HPF: ABNORMAL /HPF
BASOPHILS # BLD AUTO: 0.02 K/UL (ref 0–0.2)
BASOPHILS NFR BLD AUTO: 0.3 % (ref 0–1)
BILIRUB SERPL-MCNC: 0.2 MG/DL (ref ?–1.2)
BILIRUB UR QL STRIP: NEGATIVE
BUN SERPL-MCNC: 13 MG/DL (ref 7–18)
BUN/CREAT SERPL: 12 (ref 6–20)
CALCIUM SERPL-MCNC: 8.8 MG/DL (ref 8.5–10.1)
CHLORIDE SERPL-SCNC: 104 MMOL/L (ref 98–107)
CHOLEST SERPL-MCNC: 104 MG/DL (ref 0–200)
CHOLEST/HDLC SERPL: 2.7 {RATIO}
CLARITY UR: CLEAR
CO2 SERPL-SCNC: 23 MMOL/L (ref 21–32)
COLOR UR: YELLOW
CREAT SERPL-MCNC: 1.12 MG/DL (ref 0.55–1.02)
CREAT UR-MCNC: <13 MG/DL (ref 28–219)
DIFFERENTIAL METHOD BLD: ABNORMAL
EGFR (NO RACE VARIABLE) (RUSH/TITUS): 54 ML/MIN/1.73M2
EOSINOPHIL # BLD AUTO: 0.19 K/UL (ref 0–0.5)
EOSINOPHIL NFR BLD AUTO: 2.6 % (ref 1–4)
ERYTHROCYTE [DISTWIDTH] IN BLOOD BY AUTOMATED COUNT: 14.7 % (ref 11.5–14.5)
EST. AVERAGE GLUCOSE BLD GHB EST-MCNC: 166 MG/DL
GLOBULIN SER-MCNC: 3.5 G/DL (ref 2–4)
GLUCOSE SERPL-MCNC: 313 MG/DL (ref 74–106)
GLUCOSE UR STRIP-MCNC: >=1000 MG/DL
HBA1C MFR BLD HPLC: 7.4 % (ref 4.5–6.6)
HCT VFR BLD AUTO: 37.9 % (ref 38–47)
HDLC SERPL-MCNC: 39 MG/DL (ref 40–60)
HGB BLD-MCNC: 11.5 G/DL (ref 12–16)
KETONES UR STRIP-SCNC: NEGATIVE MG/DL
LDLC SERPL CALC-MCNC: 52 MG/DL
LDLC/HDLC SERPL: 1.3 {RATIO}
LEUKOCYTE ESTERASE UR QL STRIP: ABNORMAL
LYMPHOCYTES # BLD AUTO: 2.06 K/UL (ref 1–4.8)
LYMPHOCYTES NFR BLD AUTO: 27.8 % (ref 27–41)
MCH RBC QN AUTO: 27 PG (ref 27–31)
MCHC RBC AUTO-ENTMCNC: 30.3 G/DL (ref 32–36)
MCV RBC AUTO: 89 FL (ref 80–96)
MICROALBUMIN UR-MCNC: <0.5 MG/DL (ref 0–2.8)
MICROALBUMIN/CREAT RATIO PNL UR: ABNORMAL
MONOCYTES # BLD AUTO: 0.49 K/UL (ref 0–0.8)
MONOCYTES NFR BLD AUTO: 6.6 % (ref 2–6)
MPC BLD CALC-MCNC: 12.1 FL (ref 9.4–12.4)
NEUTROPHILS # BLD AUTO: 4.64 K/UL (ref 1.8–7.7)
NEUTROPHILS NFR BLD AUTO: 62.7 % (ref 53–65)
NITRITE UR QL STRIP: NEGATIVE
NONHDLC SERPL-MCNC: 65 MG/DL
PH UR STRIP: 6.5 PH UNITS
PLATELET # BLD AUTO: 243 K/UL (ref 150–400)
POTASSIUM SERPL-SCNC: 4.4 MMOL/L (ref 3.5–5.1)
PROT SERPL-MCNC: 6.7 G/DL (ref 6.4–8.2)
PROT UR QL STRIP: NEGATIVE
RBC # BLD AUTO: 4.26 M/UL (ref 4.2–5.4)
RBC # UR STRIP: NEGATIVE /UL
RBC #/AREA URNS HPF: ABNORMAL /HPF
SODIUM SERPL-SCNC: 140 MMOL/L (ref 136–145)
SP GR UR STRIP: <=1.005
TRIGL SERPL-MCNC: 64 MG/DL (ref 35–150)
TSH SERPL DL<=0.005 MIU/L-ACNC: 4.39 UIU/ML (ref 0.36–3.74)
UROBILINOGEN UR STRIP-ACNC: 0.2 MG/DL
VLDLC SERPL-MCNC: 13 MG/DL
WBC # BLD AUTO: 7.4 K/UL (ref 4.5–11)
WBC #/AREA URNS HPF: ABNORMAL /HPF

## 2024-07-09 PROCEDURE — 84443 ASSAY THYROID STIM HORMONE: CPT | Performed by: REGISTERED NURSE

## 2024-07-09 PROCEDURE — 80053 COMPREHEN METABOLIC PANEL: CPT | Performed by: REGISTERED NURSE

## 2024-07-09 PROCEDURE — 80061 LIPID PANEL: CPT | Performed by: REGISTERED NURSE

## 2024-07-09 PROCEDURE — 82570 ASSAY OF URINE CREATININE: CPT | Performed by: REGISTERED NURSE

## 2024-07-09 PROCEDURE — 85025 COMPLETE CBC W/AUTO DIFF WBC: CPT | Performed by: REGISTERED NURSE

## 2024-07-09 PROCEDURE — 81003 URINALYSIS AUTO W/O SCOPE: CPT | Performed by: REGISTERED NURSE

## 2024-07-09 PROCEDURE — 83036 HEMOGLOBIN GLYCOSYLATED A1C: CPT | Performed by: REGISTERED NURSE

## 2024-07-09 PROCEDURE — 82043 UR ALBUMIN QUANTITATIVE: CPT | Performed by: REGISTERED NURSE

## 2024-11-11 ENCOUNTER — LAB REQUISITION (OUTPATIENT)
Dept: LAB | Facility: HOSPITAL | Age: 69
End: 2024-11-11
Attending: REGISTERED NURSE
Payer: MEDICARE

## 2024-11-11 DIAGNOSIS — E11.9 TYPE 2 DIABETES MELLITUS WITHOUT COMPLICATIONS: ICD-10-CM

## 2024-11-11 DIAGNOSIS — E03.9 HYPOTHYROIDISM, UNSPECIFIED: ICD-10-CM

## 2024-11-11 DIAGNOSIS — I10 ESSENTIAL (PRIMARY) HYPERTENSION: ICD-10-CM

## 2024-11-11 LAB
ANION GAP SERPL CALCULATED.3IONS-SCNC: 12 MMOL/L (ref 7–16)
BILIRUB UR QL STRIP: NEGATIVE
BUN SERPL-MCNC: 15 MG/DL (ref 7–18)
BUN/CREAT SERPL: 14 (ref 6–20)
CALCIUM SERPL-MCNC: 8.5 MG/DL (ref 8.5–10.1)
CHLORIDE SERPL-SCNC: 106 MMOL/L (ref 98–107)
CLARITY UR: CLEAR
CO2 SERPL-SCNC: 28 MMOL/L (ref 21–32)
COLOR UR: YELLOW
CREAT SERPL-MCNC: 1.06 MG/DL (ref 0.55–1.02)
EGFR (NO RACE VARIABLE) (RUSH/TITUS): 58 ML/MIN/1.73M2
EST. AVERAGE GLUCOSE BLD GHB EST-MCNC: 206 MG/DL
GLUCOSE SERPL-MCNC: 147 MG/DL (ref 74–106)
GLUCOSE UR STRIP-MCNC: >=1000 MG/DL
HBA1C MFR BLD HPLC: 8.8 % (ref 4.5–6.6)
KETONES UR STRIP-SCNC: NEGATIVE MG/DL
LEUKOCYTE ESTERASE UR QL STRIP: NEGATIVE
NITRITE UR QL STRIP: NEGATIVE
PH UR STRIP: 6 PH UNITS
POTASSIUM SERPL-SCNC: 4 MMOL/L (ref 3.5–5.1)
PROT UR QL STRIP: NEGATIVE
RBC # UR STRIP: NEGATIVE /UL
SODIUM SERPL-SCNC: 142 MMOL/L (ref 136–145)
SP GR UR STRIP: 1.01
TSH SERPL DL<=0.005 MIU/L-ACNC: 3.86 UIU/ML (ref 0.36–3.74)
UROBILINOGEN UR STRIP-ACNC: 0.2 MG/DL

## 2024-11-11 PROCEDURE — 84443 ASSAY THYROID STIM HORMONE: CPT

## 2024-11-11 PROCEDURE — 81003 URINALYSIS AUTO W/O SCOPE: CPT

## 2024-11-11 PROCEDURE — 83036 HEMOGLOBIN GLYCOSYLATED A1C: CPT

## 2024-11-11 PROCEDURE — 80048 BASIC METABOLIC PNL TOTAL CA: CPT

## 2025-01-06 ENCOUNTER — LAB REQUISITION (OUTPATIENT)
Dept: LAB | Facility: HOSPITAL | Age: 70
End: 2025-01-06
Attending: REGISTERED NURSE
Payer: MEDICARE

## 2025-01-06 DIAGNOSIS — E03.9 HYPOTHYROIDISM, UNSPECIFIED: ICD-10-CM

## 2025-01-06 LAB — TSH SERPL DL<=0.005 MIU/L-ACNC: 2.32 UIU/ML (ref 0.35–4.94)

## 2025-01-06 PROCEDURE — 84443 ASSAY THYROID STIM HORMONE: CPT

## 2025-02-03 ENCOUNTER — LAB REQUISITION (OUTPATIENT)
Dept: LAB | Facility: HOSPITAL | Age: 70
End: 2025-02-03
Attending: REGISTERED NURSE
Payer: MEDICARE

## 2025-02-03 DIAGNOSIS — I10 ESSENTIAL (PRIMARY) HYPERTENSION: ICD-10-CM

## 2025-02-03 DIAGNOSIS — E11.9 TYPE 2 DIABETES MELLITUS WITHOUT COMPLICATIONS: ICD-10-CM

## 2025-02-03 DIAGNOSIS — E03.9 HYPOTHYROIDISM, UNSPECIFIED: ICD-10-CM

## 2025-02-03 LAB
ANION GAP SERPL CALCULATED.3IONS-SCNC: 13 MMOL/L (ref 7–16)
BACTERIA #/AREA URNS HPF: ABNORMAL /HPF
BASOPHILS # BLD AUTO: 0.02 K/UL (ref 0–0.2)
BASOPHILS NFR BLD AUTO: 0.3 % (ref 0–1)
BILIRUB UR QL STRIP: NEGATIVE
BUN SERPL-MCNC: 13 MG/DL (ref 10–20)
BUN/CREAT SERPL: 17 (ref 6–20)
CALCIUM SERPL-MCNC: 9.5 MG/DL (ref 8.4–10.2)
CHLORIDE SERPL-SCNC: 105 MMOL/L (ref 98–107)
CLARITY UR: ABNORMAL
CO2 SERPL-SCNC: 25 MMOL/L (ref 23–31)
COLOR UR: YELLOW
CREAT SERPL-MCNC: 0.76 MG/DL (ref 0.55–1.02)
EGFR (NO RACE VARIABLE) (RUSH/TITUS): 86 ML/MIN/1.73M2
EOSINOPHIL # BLD AUTO: 0.11 K/UL (ref 0–0.5)
EOSINOPHIL NFR BLD AUTO: 1.5 % (ref 1–4)
ERYTHROCYTE [DISTWIDTH] IN BLOOD BY AUTOMATED COUNT: 14.7 % (ref 11.5–14.5)
EST. AVERAGE GLUCOSE BLD GHB EST-MCNC: 180 MG/DL
GLUCOSE SERPL-MCNC: 147 MG/DL (ref 82–115)
GLUCOSE UR STRIP-MCNC: >=1000 MG/DL
HBA1C MFR BLD HPLC: 7.9 %
HCT VFR BLD AUTO: 39.8 % (ref 38–47)
HGB BLD-MCNC: 12.6 G/DL (ref 12–16)
IRON SERPL-MCNC: 57 UG/DL (ref 50–170)
KETONES UR STRIP-SCNC: NEGATIVE MG/DL
LEUKOCYTE ESTERASE UR QL STRIP: ABNORMAL
LYMPHOCYTES # BLD AUTO: 2.17 K/UL (ref 1–4.8)
LYMPHOCYTES NFR BLD AUTO: 30.3 % (ref 27–41)
MCH RBC QN AUTO: 27.1 PG (ref 27–31)
MCHC RBC AUTO-ENTMCNC: 31.7 G/DL (ref 32–36)
MCV RBC AUTO: 85.6 FL (ref 80–96)
MONOCYTES # BLD AUTO: 0.45 K/UL (ref 0–0.8)
MONOCYTES NFR BLD AUTO: 6.3 % (ref 2–6)
MPC BLD CALC-MCNC: 11.6 FL (ref 9.4–12.4)
NEUTROPHILS # BLD AUTO: 4.41 K/UL (ref 1.8–7.7)
NEUTROPHILS NFR BLD AUTO: 61.6 % (ref 53–65)
NITRITE UR QL STRIP: NEGATIVE
PH UR STRIP: 5.5 PH UNITS
PLATELET # BLD AUTO: 230 K/UL (ref 150–400)
POTASSIUM SERPL-SCNC: 4.4 MMOL/L (ref 3.5–5.1)
PROT UR QL STRIP: NEGATIVE
RBC # BLD AUTO: 4.65 M/UL (ref 4.2–5.4)
RBC # UR STRIP: NEGATIVE /UL
RBC #/AREA URNS HPF: ABNORMAL /HPF
SODIUM SERPL-SCNC: 139 MMOL/L (ref 136–145)
SP GR UR STRIP: <=1.005
SQUAMOUS #/AREA URNS LPF: ABNORMAL /LPF
TSH SERPL DL<=0.005 MIU/L-ACNC: 3.14 UIU/ML (ref 0.35–4.94)
UROBILINOGEN UR STRIP-ACNC: 0.2 MG/DL
WBC # BLD AUTO: 7.16 K/UL (ref 4.5–11)
WBC #/AREA URNS HPF: ABNORMAL /HPF
YEAST #/AREA URNS HPF: ABNORMAL /HPF

## 2025-02-03 PROCEDURE — 83540 ASSAY OF IRON: CPT | Performed by: REGISTERED NURSE

## 2025-02-03 PROCEDURE — 80048 BASIC METABOLIC PNL TOTAL CA: CPT | Performed by: REGISTERED NURSE

## 2025-02-03 PROCEDURE — 85025 COMPLETE CBC W/AUTO DIFF WBC: CPT | Performed by: REGISTERED NURSE

## 2025-02-03 PROCEDURE — 84443 ASSAY THYROID STIM HORMONE: CPT | Performed by: REGISTERED NURSE

## 2025-02-03 PROCEDURE — 83036 HEMOGLOBIN GLYCOSYLATED A1C: CPT | Performed by: REGISTERED NURSE

## 2025-02-03 PROCEDURE — 81003 URINALYSIS AUTO W/O SCOPE: CPT | Performed by: REGISTERED NURSE

## 2025-08-29 ENCOUNTER — LAB REQUISITION (OUTPATIENT)
Dept: LAB | Facility: HOSPITAL | Age: 70
End: 2025-08-29
Attending: NURSE PRACTITIONER
Payer: MEDICARE

## 2025-08-29 DIAGNOSIS — I10 ESSENTIAL (PRIMARY) HYPERTENSION: ICD-10-CM

## 2025-08-29 LAB
ANION GAP SERPL CALCULATED.3IONS-SCNC: 9 MMOL/L (ref 7–16)
BUN SERPL-MCNC: 19 MG/DL (ref 10–20)
BUN/CREAT SERPL: 23 (ref 6–20)
CALCIUM SERPL-MCNC: 8.6 MG/DL (ref 8.4–10.2)
CHLORIDE SERPL-SCNC: 110 MMOL/L (ref 98–107)
CO2 SERPL-SCNC: 27 MMOL/L (ref 23–31)
CREAT SERPL-MCNC: 0.81 MG/DL (ref 0.55–1.02)
EGFR (NO RACE VARIABLE) (RUSH/TITUS): 80 ML/MIN/1.73M2
GLUCOSE SERPL-MCNC: 121 MG/DL (ref 82–115)
POTASSIUM SERPL-SCNC: 3.9 MMOL/L (ref 3.5–5.1)
SODIUM SERPL-SCNC: 142 MMOL/L (ref 136–145)

## 2025-08-29 PROCEDURE — 80048 BASIC METABOLIC PNL TOTAL CA: CPT | Performed by: NURSE PRACTITIONER
